# Patient Record
Sex: FEMALE | Race: ASIAN | URBAN - METROPOLITAN AREA
[De-identification: names, ages, dates, MRNs, and addresses within clinical notes are randomized per-mention and may not be internally consistent; named-entity substitution may affect disease eponyms.]

---

## 2017-01-11 DIAGNOSIS — J40 BRONCHITIS: ICD-10-CM

## 2017-05-11 ENCOUNTER — HOSPITAL ENCOUNTER (OUTPATIENT)
Dept: LAB | Age: 62
Discharge: HOME | End: 2017-05-11
Payer: OTHER GOVERNMENT

## 2017-05-11 ENCOUNTER — OFFICE VISIT (OUTPATIENT)
Dept: INTERNAL MEDICINE CLINIC | Age: 62
End: 2017-05-11

## 2017-05-11 VITALS
BODY MASS INDEX: 24.38 KG/M2 | WEIGHT: 113 LBS | HEART RATE: 83 BPM | TEMPERATURE: 97.9 F | DIASTOLIC BLOOD PRESSURE: 72 MMHG | OXYGEN SATURATION: 99 % | SYSTOLIC BLOOD PRESSURE: 123 MMHG | RESPIRATION RATE: 18 BRPM | HEIGHT: 57 IN

## 2017-05-11 DIAGNOSIS — E78.00 HYPERCHOLESTEREMIA: ICD-10-CM

## 2017-05-11 DIAGNOSIS — H81.11 BPPV (BENIGN PAROXYSMAL POSITIONAL VERTIGO), RIGHT: ICD-10-CM

## 2017-05-11 DIAGNOSIS — Z12.11 SCREEN FOR COLON CANCER: ICD-10-CM
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
## 2017-05-11 DIAGNOSIS — R60.9 DEPENDENT EDEMA: ICD-10-CM

## 2017-05-11 DIAGNOSIS — Z12.31 VISIT FOR SCREENING MAMMOGRAM: ICD-10-CM

## 2017-05-11 DIAGNOSIS — I10 ESSENTIAL HYPERTENSION: ICD-10-CM

## 2017-05-11 DIAGNOSIS — I10 ESSENTIAL HYPERTENSION: Primary | ICD-10-CM

## 2017-05-11 LAB
ALBUMIN SERPL BCP-MCNC: 4.3 G/DL (ref 3.4–5)
ALBUMIN/GLOB SERPL: 1.3 {RATIO} (ref 0.8–1.7)
ALP SERPL-CCNC: 125 U/L (ref 45–117)
ALT SERPL-CCNC: 27 U/L (ref 13–56)
ANION GAP BLD CALC-SCNC: 11 MMOL/L (ref 3–18)
AST SERPL W P-5'-P-CCNC: 24 U/L (ref 15–37)
BILIRUB SERPL-MCNC: 0.3 MG/DL (ref 0.2–1)
BUN SERPL-MCNC: 16 MG/DL (ref 7–18)
BUN/CREAT SERPL: 18 (ref 12–20)
CALCIUM SERPL-MCNC: 8.8 MG/DL (ref 8.5–10.1)
CHLORIDE SERPL-SCNC: 102 MMOL/L (ref 100–108)
CHOLEST SERPL-MCNC: 175 MG/DL
CO2 SERPL-SCNC: 25 MMOL/L (ref 21–32)
CREAT SERPL-MCNC: 0.9 MG/DL (ref 0.6–1.3)
GLOBULIN SER CALC-MCNC: 3.4 G/DL (ref 2–4)
GLUCOSE SERPL-MCNC: 107 MG/DL (ref 74–99)
HDLC SERPL-MCNC: 87 MG/DL (ref 40–60)
HDLC SERPL: 2 {RATIO} (ref 0–5)
LDLC SERPL CALC-MCNC: 75.4 MG/DL (ref 0–100)
LIPID PROFILE,FLP: ABNORMAL
POTASSIUM SERPL-SCNC: 3.4 MMOL/L (ref 3.5–5.5)
PROT SERPL-MCNC: 7.7 G/DL (ref 6.4–8.2)
SODIUM SERPL-SCNC: 138 MMOL/L (ref 136–145)
TRIGL SERPL-MCNC: 63 MG/DL (ref ?–150)
VLDLC SERPL CALC-MCNC: 12.6 MG/DL

## 2017-05-11 PROCEDURE — 80061 LIPID PANEL: CPT | Performed by: FAMILY MEDICINE

## 2017-05-11 PROCEDURE — 80053 COMPREHEN METABOLIC PANEL: CPT | Performed by: FAMILY MEDICINE

## 2017-05-11 PROCEDURE — 36415 COLL VENOUS BLD VENIPUNCTURE: CPT | Performed by: FAMILY MEDICINE

## 2017-05-11 RX ORDER — LOSARTAN POTASSIUM 50 MG/1
50 TABLET ORAL DAILY
Qty: 90 TAB | Refills: 1 | Status: SHIPPED | OUTPATIENT
Start: 2017-05-11 | End: 2017-07-18 | Stop reason: SDUPTHER

## 2017-05-11 RX ORDER — SIMVASTATIN 20 MG/1
TABLET, FILM COATED ORAL
Qty: 90 TAB | Refills: 1 | Status: SHIPPED | OUTPATIENT
Start: 2017-05-11 | End: 2017-07-18 | Stop reason: SDUPTHER

## 2017-05-11 RX ORDER — MECLIZINE HYDROCHLORIDE 25 MG/1
25 TABLET ORAL
Qty: 270 TAB | Refills: 1 | Status: SHIPPED | OUTPATIENT
Start: 2017-05-11 | End: 2017-07-18 | Stop reason: SDUPTHER

## 2017-05-11 NOTE — PROGRESS NOTES
Theo Latham presents today at the clinic for      Chief Complaint   Patient presents with    Cholesterol Problem    Labs    Medication Refill    Hypertension        Wt Readings from Last 3 Encounters:   05/11/17 113 lb (51.3 kg)   12/29/16 109 lb (49.4 kg)   10/13/16 107 lb (48.5 kg)     Temp Readings from Last 3 Encounters:   05/11/17 97.9 °F (36.6 °C) (Oral)   12/29/16 98.6 °F (37 °C) (Oral)   10/13/16 98 °F (36.7 °C) (Oral)     BP Readings from Last 3 Encounters:   05/11/17 123/72   12/29/16 131/76   10/13/16 134/77     Pulse Readings from Last 3 Encounters:   05/11/17 83   12/29/16 69   10/13/16 80       There are no preventive care reminders to display for this patient. Coordination of Care:    1. Have you been to the ER, urgent care clinic since your last visit? Hospitalized since your last visit? No    2. Have you seen or consulted any other health care providers outside of the [address] [place] [address] since your last visit? Include any pap smears or colon screening.  No

## 2017-05-11 NOTE — MR AVS SNAPSHOT
Visit Information Date & Time Provider Department Dept. Phone Encounter #  
 5/11/2017  9:30 AM Renea Jansen MD [facility] [phone] [id number] Follow-up Instructions Return in about 6 months (around 11/11/2017) for HTN, hyperlipidemia. Upcoming Health Maintenance Date Due COLONOSCOPY 9/22/2017* BREAST CANCER SCRN MAMMOGRAM 9/29/2017* INFLUENZA AGE 9 TO ADULT 8/1/2017 PAP AKA CERVICAL CYTOLOGY 5/11/2020 DTaP/Tdap/Td series (2 - Td) 4/28/2026 *Topic was postponed. The date shown is not the original due date. Allergies as of 5/11/2017  Review Complete On: 5/11/2017 By: Renea Jansen MD  
 No Known Allergies Current Immunizations  Reviewed on 10/13/2016 Name Date Influenza Vaccine (Quad) PF 10/13/2016, 10/23/2015 Tdap 4/28/2016 Not reviewed this visit You Were Diagnosed With   
  
 Codes Comments Essential hypertension    -  Primary ICD-10-CM: I10 
ICD-9-CM: 401.9 Hypercholesteremia     ICD-10-CM: E78.00 ICD-9-CM: 272.0 Visit for screening mammogram     ICD-10-CM: Z12.31 
ICD-9-CM: V76.12 Screen for colon cancer     ICD-10-CM: Z12.11 ICD-9-CM: V76.51 Dependent edema     ICD-10-CM: R60.9 ICD-9-CM: 904. 3 Vitals BP Pulse Temp Resp Height(growth percentile) Weight(growth percentile) 123/72 (BP 1 Location: Right arm, BP Patient Position: Sitting) 83 97.9 °F (36.6 °C) (Oral) 18 4' 9\" (1.448 m) 113 lb (51.3 kg) SpO2 BMI OB Status Smoking Status 99% 24.45 kg/m2 Postmenopausal Former Smoker Vitals History BMI and BSA Data Body Mass Index Body Surface Area  
 24.45 kg/m 2 1.44 m 2 Preferred Pharmacy Pharmacy Name Phone [facility] [phone] Your Updated Medication List  
  
   
This list is accurate as of: 5/11/17 10:12 AM.  Always use your most recent med list.  
  
  
  
  
 albuterol 90 mcg/actuation inhaler Commonly known as:  PROVENTIL HFA, VENTOLIN HFA, PROAIR HFA Take 2 Puffs by inhalation every six (6) hours as needed for Wheezing. amLODIPine 10 mg tablet Commonly known as:  [place]ack[place] Cradle TAKE 1 TABLET BY MOUTH ONCE DAILY  Indications: HYPERTENSION  
  
 cetirizine 5 mg tablet Commonly known as:  ZYRTEC Take 1 Tab by mouth daily as needed for Allergies. Indications: ALLERGIC RHINITIS  
  
 inhalational spacing device 1 Each by Does Not Apply route as needed. losartan 50 mg tablet Commonly known as:  COZAAR Take 1 Tab by mouth daily. simvastatin 20 mg tablet Commonly known as:  ZOCOR  
TAKE 1 TABLET BY MOUTH NIGHTLY AT BEDTIME  Indications: hypercholesterolemia Prescriptions Sent to Pharmacy Refills  
 simvastatin (ZOCOR) 20 mg tablet 1 Sig: TAKE 1 TABLET BY MOUTH NIGHTLY AT BEDTIME  Indications: hypercholesterolemia Class: Normal  
 Pharmacy: [facility] [address], [address] [facility] Ph #: [phone]  
 losartan (COZAAR) 50 mg tablet 1 Sig: Take 1 Tab by mouth daily. Class: Normal  
 Pharmacy: [facility]. [address], [address] [facility] Ph #: [phone] Route: Oral  
  
We Performed the Following REFERRAL FOR COLONOSCOPY [LRZ427 Custom] Comments:  
 Please evaluate patient for screening for colon CA. Send to [facility]. Follow-up Instructions Return in about 6 months (around 11/11/2017) for HTN, hyperlipidemia. To-Do List   
 05/11/2017 Lab:  LIPID PANEL   
  
 05/11/2017 Imaging:  [facility]MO BI SCREENING INCL CAD   
  
 05/11/2017 Lab:  METABOLIC PANEL, COMPREHENSIVE Referral Information Referral ID Referred By Referred To  
  
 [id number] Jackie Benton Not Available Visits Status Start Date End Date 1 New Request 5/11/17 5/11/18 If your referral has a status of pending review or denied, additional information will be sent to support the outcome of this decision. Patient Instructions Bedbugs: Care Instructions Your Care Instructions Bedbugs are tiny bugs that feed on blood from animals or people. They have that name because they like to hide in bedding and mattresses. Bedbugs rarely spread disease to people. But itching from the bites can be so bad that you may scratch hard enough to break the skin. That can lead to infection. The bites can also cause an allergic reaction in some people. The bugs can spread into cracks and crevices in the room and lay their eggs in anything that is in the room, including clothing and furniture. This makes them very hard to kill. Getting rid of bedbugs The best way to get rid of bedbugs is to call a professional insect control company. They use special pesticides and other equipment to kill the bugs and their eggs. If you decide to buy your own pesticide, check the label. Make sure it says that it is for bedbugs. Be sure to follow the directions carefully. You may have to use the pesticide more than once. Vacuum or launder everything in the room that might hide the bugs. Washing and then drying items in a dryer on a hot setting works to kill bedbugs in clothing and linens. Turn the dryer to the hottest setting that the fabric can handle. After your mattress has been cleared of bedbugs, you can buy a special mattress cover that is made to keep bedbugs out of your mattress. Follow-up care is a key part of your treatment and safety. Be sure to make and go to all appointments, and call your doctor if you are having problems. It's also a good idea to know your test results and keep a list of the medicines you take. How can you care for yourself at home? · Wash the bites with soap to lower the chance of infection. Try not to scratch. · Use calamine lotion or an anti-itch cream to stop the itching. You can also hold an oatmeal-soaked washcloth on the itchy area for 15 minutes. You can buy an oatmeal powder, such as Aveeno Colloidal Oatmeal, in drugstores. · Use an ice pack to stop the swelling. When should you call for help? Call your doctor now or seek immediate medical care if: 
· You have signs of infection, such as: 
¨ Increased pain, swelling, warmth, or redness. ¨ Red streaks leading from a bite area. ¨ Pus draining from a bite area. ¨ A fever. Watch closely for changes in your health, and be sure to contact your doctor if: · Anyone else in your family has itching. · You do not get better as expected. Where can you learn more? Go to http://kalpesh-melina.info/. Enter G246 in the search box to learn more about \"Bedbugs: Care Instructions. \" Current as of: November 1, 2016 Content Version: 11.2 © 9712-4325 [employer]. Care instructions adapted under license by [employer] (which disclaims liability or warranty for this information). If you have questions about a medical condition or this instruction, always ask your healthcare professional. [address] any warranty or liability for your use of this information. Introducing [facility]! Dear Kavya Owen: Thank you for requesting a [employer] account. Our records indicate that you already have an active [employer] account. You can access your account anytime at [email or web address] Did you know that you can access your hospital and ER discharge instructions at any time in [employer]? You can also review all of your test results from your hospital stay or ER visit. Additional Information If you have questions, please visit the Frequently Asked Questions section of the [employer] website at [email or web address] Remember, MyChart is NOT to be used for urgent needs. For medical emergencies, dial 911. Now available from your iPhone and Android! Please provide this summary of care documentation to your next provider. Your primary care clinician is listed as Al Ann. If you have any questions after today's visit, please call [phone].

## 2017-05-11 NOTE — PATIENT INSTRUCTIONS
Bedbugs: Care Instructions  Your Care Instructions    Bedbugs are tiny bugs that feed on blood from animals or people. They have that name because they like to hide in bedding and mattresses. Bedbugs rarely spread disease to people. But itching from the bites can be so bad that you may scratch hard enough to break the skin. That can lead to infection. The bites can also cause an allergic reaction in some people. The bugs can spread into cracks and crevices in the room and lay their eggs in anything that is in the room, including clothing and furniture. This makes them very hard to kill. Getting rid of bedbugs  The best way to get rid of bedbugs is to call a professional insect control company. They use special pesticides and other equipment to kill the bugs and their eggs. If you decide to buy your own pesticide, check the label. Make sure it says that it is for bedbugs. Be sure to follow the directions carefully. You may have to use the pesticide more than once. Vacuum or launder everything in the room that might hide the bugs. Washing and then drying items in a dryer on a hot setting works to kill bedbugs in clothing and linens. Turn the dryer to the hottest setting that the fabric can handle. After your mattress has been cleared of bedbugs, you can buy a special mattress cover that is made to keep bedbugs out of your mattress. Follow-up care is a key part of your treatment and safety. Be sure to make and go to all appointments, and call your doctor if you are having problems. It's also a good idea to know your test results and keep a list of the medicines you take. How can you care for yourself at home? · Wash the bites with soap to lower the chance of infection. Try not to scratch. · Use calamine lotion or an anti-itch cream to stop the itching. You can also hold an oatmeal-soaked washcloth on the itchy area for 15 minutes.  You can buy an oatmeal powder, such as Aveeno Colloidal Oatmeal, in drugstores. · Use an ice pack to stop the swelling. When should you call for help? Call your doctor now or seek immediate medical care if:  · You have signs of infection, such as:  ¨ Increased pain, swelling, warmth, or redness. ¨ Red streaks leading from a bite area. ¨ Pus draining from a bite area. ¨ A fever. Watch closely for changes in your health, and be sure to contact your doctor if:  · Anyone else in your family has itching. · You do not get better as expected. Where can you learn more? Go to http://kalpesh-melina.info/. Enter G246 in the search box to learn more about \"Bedbugs: Care Instructions. \"  Current as of: November 1, 2016  Content Version: 11.2  © 7274-1734 [employer]. Care instructions adapted under license by [employer] (which disclaims liability or warranty for this information). If you have questions about a medical condition or this instruction, always ask your healthcare professional. [address] any warranty or liability for your use of this information.

## 2017-05-11 NOTE — PROGRESS NOTES
Subjective:   Patient is a 58y.o. year old female who presents for Cholesterol Problem; Labs; Medication Refill; and Hypertension  1. HTN:  On amlodipine. No CP, SOB. Gets some swelling in the legs, worse in the left ankle, leg.    2.  LE edema:  She has compression stockings. No orthopnea. This is chronic. Has in both legs but worse in left. Has been on amlodipine which could cause this. 3.  Hyperlipidemia:  Stable on Zocor. No complaints    4. Allergic rhinitis:  Stable on OTC Zyrtec. 5.  BPPV:  Dizziness much better now. But uses meclizine prn still when gets dizziness. 6.  Bug bites: On arms. Saw small bugs crawling on her when at other relative's house and ID'd as bed bugs. Rash slowly resolving now. No current itching. Bumps have gone away    Review of Systems   Constitutional: Negative. Cardiovascular: Negative. Skin: Positive for rash. Current Outpatient Prescriptions on File Prior to Visit   Medication Sig Dispense Refill    albuterol (PROVENTIL HFA, VENTOLIN HFA, PROAIR HFA) 90 mcg/actuation inhaler Take 2 Puffs by inhalation every six (6) hours as needed for Wheezing. 1 Inhaler 3    inhalational spacing device 1 Each by Does Not Apply route as needed. 1 Device 0    cetirizine (ZYRTEC) 5 mg tablet Take 1 Tab by mouth daily as needed for Allergies. Indications: ALLERGIC RHINITIS 30 Tab 1    amLODIPine (NORVASC) 10 mg tablet TAKE 1 TABLET BY MOUTH ONCE DAILY  Indications: HYPERTENSION 90 Tab 1     No current facility-administered medications on file prior to visit. Reviewed PmHx, RxHx, FmHx, SocHx, AllgHx and updated and dated in the chart. Nurse notes were reviewed and are correct    Objective:     Vitals:    05/11/17 0912   BP: 123/72   Pulse: 83   Resp: 18   Temp: 97.9 °F (36.6 °C)   TempSrc: Oral   SpO2: 99%   Weight: 113 lb (51.3 kg)   Height: 4' 9\" (1.448 m)     Physical Exam   Constitutional: She is oriented to person, place, and time.  She appears well-developed and well-nourished. No distress. HENT:   Head: Normocephalic and atraumatic. Right Ear: External ear normal.   Left Ear: External ear normal.   Nose: Nose normal.   Mouth/Throat: Oropharynx is clear and moist.   Eyes: EOM are normal. Pupils are equal, round, and reactive to light. Neck: Normal range of motion. Neck supple. Carotid bruit is not present. No tracheal deviation present. Cardiovascular: Normal rate, regular rhythm, normal heart sounds and intact distal pulses. Exam reveals no gallop and no friction rub. No murmur heard. Pulmonary/Chest: Effort normal and breath sounds normal. She has no wheezes. She has no rales. Abdominal: Soft. Bowel sounds are normal. She exhibits no distension. There is no tenderness. Musculoskeletal: She exhibits edema (1+ on left ankle). She exhibits no tenderness. Legs:  14 inches bilaterally (no difference). No calf tenderness, neg Rosina's sign. Lymphadenopathy:     She has no cervical adenopathy. Neurological: She is alert and oriented to person, place, and time. Skin: Skin is warm and dry. Faint rash on upper arms, but currently not raised, and not erythematous. Psychiatric: She has a normal mood and affect. Her behavior is normal.   Nursing note and vitals reviewed. Assessment/ Plan:     Mari Roman was seen today for cholesterol problem, labs, medication refill and hypertension. Diagnoses and all orders for this visit:    Essential hypertension:  Because of leg swelling going to switch from Norvasc to Cozaar. Not doing ACE because she already has some cough issues and don't want to confuse things. -     METABOLIC PANEL, COMPREHENSIVE; Future  -     losartan (COZAAR) 50 mg tablet; Take 1 Tab by mouth daily. Hypercholesteremia  -     METABOLIC PANEL, COMPREHENSIVE; Future  -     LIPID PANEL;  Future  -     simvastatin (ZOCOR) 20 mg tablet; TAKE 1 TABLET BY MOUTH NIGHTLY AT BEDTIME  Indications: hypercholesterolemia    Visit for screening mammogram  -     [facility] MAMMO BI SCREENING INCL CAD; Future    Screen for colon cancer  -     REFERRAL FOR COLONOSCOPY    Dependent edema:  Stopping the amlodipine to see if that's contributing. Changing to ARB. Recommended elevation, compression stockings. If worsening, will send for venous U/S, but she's not interested at this point in getting that done. BPPV (benign paroxysmal positional vertigo), right:  Much improved, but still using some occasional meclizine. -     meclizine (ANTIVERT) 25 mg tablet; Take 1 Tab by mouth three (3) times daily as needed. Indications: VERTIGO    Bed bug bites, initial encounter:  Presumptive, based on history. The bites/rash is fading so I think it will resolve. Gave handout. Recommended washing clothes that brought back from the other home in hot water to be safe. I have discussed the diagnosis with the patient and the intended plan as seen in the above orders. The patient verbalized understanding and agrees with the plan. Follow-up Disposition:  Return in about 6 months (around 11/11/2017) for HTN, hyperlipidemia.     Flaca Velasquez MD

## 2017-05-12 NOTE — PROGRESS NOTES
Your blood work looks good. A few things: Your blood sugar is a little elevated in the prediabetes range. This is not diabetes. Watching your sugar and carbohydrates in your diet will help with this. Your potassium was slightly low. Eating some bananas can help. Cholesterol looked great!

## 2017-05-17 DIAGNOSIS — Z12.31 VISIT FOR SCREENING MAMMOGRAM: ICD-10-CM

## 2017-07-18 DIAGNOSIS — H81.11 BPPV (BENIGN PAROXYSMAL POSITIONAL VERTIGO), RIGHT: ICD-10-CM

## 2017-07-18 DIAGNOSIS — E78.00 HYPERCHOLESTEREMIA: ICD-10-CM

## 2017-07-18 DIAGNOSIS — I10 ESSENTIAL HYPERTENSION: ICD-10-CM

## 2017-07-18 DIAGNOSIS — J40 BRONCHITIS: ICD-10-CM

## 2017-07-18 RX ORDER — ALBUTEROL SULFATE 90 UG/1
2 AEROSOL, METERED RESPIRATORY (INHALATION)
Qty: 1 INHALER | Refills: 3 | Status: SHIPPED | OUTPATIENT
Start: 2017-07-18 | End: 2018-03-22 | Stop reason: SDUPTHER

## 2017-07-18 RX ORDER — AMLODIPINE BESYLATE 10 MG/1
TABLET ORAL
Qty: 90 TAB | Refills: 1 | Status: SHIPPED | OUTPATIENT
Start: 2017-07-18 | End: 2018-05-22 | Stop reason: ALTCHOICE

## 2017-07-18 RX ORDER — MECLIZINE HYDROCHLORIDE 25 MG/1
25 TABLET ORAL
Qty: 270 TAB | Refills: 1 | Status: SHIPPED | OUTPATIENT
Start: 2017-07-18 | End: 2018-03-22 | Stop reason: SDUPTHER

## 2017-07-18 RX ORDER — SIMVASTATIN 20 MG/1
TABLET, FILM COATED ORAL
Qty: 90 TAB | Refills: 1 | Status: SHIPPED | OUTPATIENT
Start: 2017-07-18 | End: 2018-03-22 | Stop reason: SDUPTHER

## 2017-07-18 RX ORDER — LOSARTAN POTASSIUM 50 MG/1
50 TABLET ORAL DAILY
Qty: 90 TAB | Refills: 1 | Status: SHIPPED | OUTPATIENT
Start: 2017-07-18 | End: 2018-03-22 | Stop reason: SDUPTHER

## 2017-07-18 RX ORDER — CETIRIZINE HYDROCHLORIDE 5 MG/1
5 TABLET ORAL
Qty: 90 TAB | Refills: 1 | Status: SHIPPED | OUTPATIENT
Start: 2017-07-18 | End: 2018-03-22 | Stop reason: SDUPTHER

## 2018-03-22 ENCOUNTER — OFFICE VISIT (OUTPATIENT)
Dept: INTERNAL MEDICINE CLINIC | Age: 63
End: 2018-03-22

## 2018-03-22 VITALS
HEART RATE: 94 BPM | OXYGEN SATURATION: 99 % | HEIGHT: 57 IN | TEMPERATURE: 98 F | RESPIRATION RATE: 18 BRPM | SYSTOLIC BLOOD PRESSURE: 131 MMHG | DIASTOLIC BLOOD PRESSURE: 73 MMHG | BODY MASS INDEX: 25.24 KG/M2 | WEIGHT: 117 LBS

## 2018-03-22 DIAGNOSIS — E78.00 HYPERCHOLESTEREMIA: ICD-10-CM

## 2018-03-22 DIAGNOSIS — K21.9 GASTROESOPHAGEAL REFLUX DISEASE, ESOPHAGITIS PRESENCE NOT SPECIFIED: ICD-10-CM

## 2018-03-22 DIAGNOSIS — H81.11 BPPV (BENIGN PAROXYSMAL POSITIONAL VERTIGO), RIGHT: ICD-10-CM

## 2018-03-22 DIAGNOSIS — I10 ESSENTIAL HYPERTENSION: ICD-10-CM

## 2018-03-22 DIAGNOSIS — J30.9 CHRONIC ALLERGIC RHINITIS, UNSPECIFIED SEASONALITY, UNSPECIFIED TRIGGER: ICD-10-CM

## 2018-03-22 DIAGNOSIS — J40 BRONCHITIS: Primary | ICD-10-CM

## 2018-03-22 DIAGNOSIS — M19.90 ARTHRITIS: ICD-10-CM

## 2018-03-22 RX ORDER — IBUPROFEN 800 MG/1
800 TABLET ORAL
Qty: 90 TAB | Refills: 1 | Status: SHIPPED | OUTPATIENT
Start: 2018-03-22 | End: 2018-05-22 | Stop reason: SDUPTHER

## 2018-03-22 RX ORDER — ALBUTEROL SULFATE 90 UG/1
2 AEROSOL, METERED RESPIRATORY (INHALATION)
Qty: 1 INHALER | Refills: 3 | Status: SHIPPED | OUTPATIENT
Start: 2018-03-22 | End: 2018-05-22 | Stop reason: SDUPTHER

## 2018-03-22 RX ORDER — AZITHROMYCIN 250 MG/1
TABLET, FILM COATED ORAL
Qty: 6 TAB | Refills: 0 | Status: SHIPPED | OUTPATIENT
Start: 2018-03-22 | End: 2018-05-22 | Stop reason: ALTCHOICE

## 2018-03-22 RX ORDER — MECLIZINE HYDROCHLORIDE 25 MG/1
25 TABLET ORAL
Qty: 270 TAB | Refills: 1 | Status: SHIPPED | OUTPATIENT
Start: 2018-03-22 | End: 2018-05-22 | Stop reason: SDUPTHER

## 2018-03-22 RX ORDER — CETIRIZINE HYDROCHLORIDE 5 MG/1
5 TABLET ORAL
Qty: 90 TAB | Refills: 1 | Status: SHIPPED | OUTPATIENT
Start: 2018-03-22 | End: 2018-05-22 | Stop reason: SDUPTHER

## 2018-03-22 RX ORDER — SIMVASTATIN 20 MG/1
TABLET, FILM COATED ORAL
Qty: 90 TAB | Refills: 1 | Status: SHIPPED | OUTPATIENT
Start: 2018-03-22 | End: 2018-05-22 | Stop reason: SDUPTHER

## 2018-03-22 RX ORDER — LOSARTAN POTASSIUM 50 MG/1
50 TABLET ORAL DAILY
Qty: 90 TAB | Refills: 1 | Status: SHIPPED | OUTPATIENT
Start: 2018-03-22 | End: 2018-05-22 | Stop reason: SDUPTHER

## 2018-03-22 RX ORDER — PHENOL/SODIUM PHENOLATE
20 AEROSOL, SPRAY (ML) MUCOUS MEMBRANE DAILY
Qty: 90 TAB | Refills: 1 | Status: SHIPPED | OUTPATIENT
Start: 2018-03-22 | End: 2018-05-22 | Stop reason: SDUPTHER

## 2018-03-22 RX ORDER — PREDNISONE 20 MG/1
40 TABLET ORAL
Qty: 10 TAB | Refills: 0 | Status: SHIPPED | OUTPATIENT
Start: 2018-03-22 | End: 2018-03-27

## 2018-03-22 NOTE — MR AVS SNAPSHOT
[address] [place] [address] [address] [address] 
[phone] Patient: Veronica Cade MRN: [id number] [facility]:1/81/9826 Visit Information Date & Time Provider Department Dept. Phone Encounter #  
 3/22/2018  3:15 PM Champ Aguayo MD [facility] [id number] Follow-up Instructions Return in about 6 months (around 9/22/2018) for HTN, arthritis, GERD. Upcoming Health Maintenance Date Due COLONOSCOPY 1/16/1973 BREAST CANCER SCRN MAMMOGRAM 1/16/2005 Influenza Age 5 to Adult 8/1/2017 PAP AKA CERVICAL CYTOLOGY 5/11/2020 DTaP/Tdap/Td series (2 - Td) 4/28/2026 Allergies as of 3/22/2018  Review Complete On: 3/22/2018 By: Chmap Aguayo MD  
 No Known Allergies Current Immunizations  Reviewed on 10/13/2016 Name Date Influenza Vaccine (Quad) PF 10/13/2016, 10/23/2015 Tdap 4/28/2016 Not reviewed this visit You Were Diagnosed With   
  
 Codes Comments Bronchitis    -  Primary ICD-10-CM: Q11 ICD-9-CM: 195 Hypercholesteremia     ICD-10-CM: E78.00 ICD-9-CM: 272.0 Essential hypertension     ICD-10-CM: I10 
ICD-9-CM: 401.9 BPPV (benign paroxysmal positional vertigo), right     ICD-10-CM: H81.11 
ICD-9-CM: 386.11 Gastroesophageal reflux disease, esophagitis presence not specified     ICD-10-CM: K21.9 ICD-9-CM: 530.81 Arthritis     ICD-10-CM: M19.90 ICD-9-CM: 716.90 Chronic allergic rhinitis, unspecified seasonality, unspecified trigger     ICD-10-CM: J30.9 ICD-9-CM: 477.9 Vitals BP Pulse Temp Resp Height(growth percentile) Weight(growth percentile) 131/73 94 98 °F (36.7 °C) (Oral) 18 4' 9\" (1.448 m) 117 lb (53.1 kg) SpO2 BMI OB Status Smoking Status 99% 25.32 kg/m2 Postmenopausal Former Smoker Vitals History BMI and BSA Data  Body Mass Index Body Surface Area  
 25.32 kg/m 2 1.46 m 2  
  
  
 Preferred Pharmacy Pharmacy Name Phone [facility], [address] [phone] Your Updated Medication List  
  
   
This list is accurate as of 3/22/18  4:08 PM.  Always use your most recent med list.  
  
  
  
  
 albuterol 90 mcg/actuation inhaler Commonly known as:  PROVENTIL HFA, VENTOLIN HFA, PROAIR HFA Take 2 Puffs by inhalation every six (6) hours as needed for Wheezing. amLODIPine 10 mg tablet Commonly known as:  Larayne Sherlyn TAKE 1 TABLET BY MOUTH ONCE DAILY  Indications: hypertension  
  
 azithromycin 250 mg tablet Commonly known as:  Evlyn Jerson Take two tablets today then one tablet daily  
  
 cetirizine 5 mg tablet Commonly known as:  ZYRTEC Take 1 Tab by mouth daily as needed for Allergies. Indications: Allergic Rhinitis  
  
 ibuprofen 800 mg tablet Commonly known as:  MOTRIN Take 1 Tab by mouth every eight (8) hours as needed for Pain.  
  
 inhalational spacing device 1 Each by Does Not Apply route as needed. losartan 50 mg tablet Commonly known as:  COZAAR Take 1 Tab by mouth daily. meclizine 25 mg tablet Commonly known as:  ANTIVERT Take 1 Tab by mouth three (3) times daily as needed. Indications: Vertigo Omeprazole delayed release 20 mg tablet Commonly known as:  PRILOSEC D/R Take 1 Tab by mouth daily. predniSONE 20 mg tablet Commonly known as:  Jackquekoreyn Mellow Take 2 Tabs by mouth daily (with breakfast) for 5 days. simvastatin 20 mg tablet Commonly known as:  ZOCOR  
TAKE 1 TABLET BY MOUTH NIGHTLY AT BEDTIME  Indications: hypercholesterolemia Prescriptions Sent to Pharmacy Refills  
 simvastatin (ZOCOR) 20 mg tablet 1 Sig: TAKE 1 TABLET BY MOUTH NIGHTLY AT BEDTIME  Indications: hypercholesterolemia Class: Normal  
 Pharmacy: [facility] [address] [facility], [address] [facility] Ph #: [phone] losartan (COZAAR) 50 mg tablet 1 Sig: Take 1 Tab by mouth daily. Class: Normal  
 Pharmacy: [address], [address] Ph #: [phone] Route: Oral  
 meclizine (ANTIVERT) 25 mg tablet 1 Sig: Take 1 Tab by mouth three (3) times daily as needed. Indications: Vertigo Class: Normal  
 Pharmacy: [address], [address] Ph #: [phone] Route: Oral  
 albuterol (PROVENTIL HFA, VENTOLIN HFA, PROAIR HFA) 90 mcg/actuation inhaler 3 Sig: Take 2 Puffs by inhalation every six (6) hours as needed for Wheezing. Class: Normal  
 Pharmacy: [address], [address] [place] [address] Ph #: [phone] Route: Inhalation  
 cetirizine (ZYRTEC) 5 mg tablet 1 Sig: Take 1 Tab by mouth daily as needed for Allergies. Indications: Allergic Rhinitis Class: Normal  
 Pharmacy: [address], [address] [place] [address] Ph #: [phone] Route: Oral  
 Omeprazole delayed release (PRILOSEC D/R) 20 mg tablet 1 Sig: Take 1 Tab by mouth daily. Class: Normal  
 Pharmacy: [address], [address] [place] [address] Ph #: [phone] Route: Oral  
 ibuprofen (MOTRIN) 800 mg tablet 1 Sig: Take 1 Tab by mouth every eight (8) hours as needed for Pain. Class: Normal  
 Pharmacy: [address], [address] Ph #: [phone] Route: Oral  
 predniSONE (DELTASONE) 20 mg tablet 0 Sig: Take 2 Tabs by mouth daily (with breakfast) for 5 days. Class: Normal  
 Pharmacy: [address], [address] [place] [address] Ph #: [phone] Route: Oral  
 azithromycin (ZITHROMAX Z-BILL) 250 mg tablet 0 Sig: Take two tablets today then one tablet daily  Class: Normal  
 Pharmacy: [address], [address] [place] [address] Drive [address] [place] [address] #: [phone] Follow-up Instructions Return in about 6 months (around 9/22/2018) for HTN, arthritis, GERD. Patient Instructions Bronchitis: Care Instructions Your Care Instructions Bronchitis is inflammation of the bronchial tubes, which carry air to the lungs. The tubes swell and produce mucus, or phlegm. The mucus and inflamed bronchial tubes make you cough. You may have trouble breathing. Most cases of bronchitis are caused by viruses like those that cause colds. Antibiotics usually do not help and they may be harmful. Bronchitis usually develops rapidly and lasts about 2 to 3 weeks in otherwise healthy people. Follow-up care is a key part of your treatment and safety. Be sure to make and go to all appointments, and call your doctor if you are having problems. It's also a good idea to know your test results and keep a list of the medicines you take. How can you care for yourself at home? · Take all medicines exactly as prescribed. Call your doctor if you think you are having a problem with your medicine. · Get some extra rest. 
· Take an over-the-counter pain medicine, such as acetaminophen (Tylenol), ibuprofen (Advil, Motrin), or naproxen (Aleve) to reduce fever and relieve body aches. Read and follow all instructions on the label. · Do not take two or more pain medicines at the same time unless the doctor told you to. Many pain medicines have acetaminophen, which is Tylenol. Too much acetaminophen (Tylenol) can be harmful. · Take an over-the-counter cough medicine that contains dextromethorphan to help quiet a dry, hacking cough so that you can sleep. Avoid cough medicines that have more than one active ingredient. Read and follow all instructions on the label. · Breathe moist air from a humidifier, hot shower, or sink filled with hot water. The heat and moisture will thin mucus so you can cough it out. · Do not smoke. Smoking can make bronchitis worse. If you need help quitting, talk to your doctor about stop-smoking programs and medicines. These can increase your chances of quitting for good. When should you call for help? Call 911 anytime you think you may need emergency care. For example, call if: 
? · You have severe trouble breathing. ?Call your doctor now or seek immediate medical care if: 
? · You have new or worse trouble breathing. ? · You cough up dark brown or bloody mucus (sputum). ? · You have a new or higher fever. ? · You have a new rash. ? Watch closely for changes in your health, and be sure to contact your doctor if: 
? · You cough more deeply or more often, especially if you notice more mucus or a change in the color of your mucus. ? · You are not getting better as expected. Where can you learn more? Go to http://kalpesh-melina.info/. Enter H333 in the search box to learn more about \"Bronchitis: Care Instructions. \" Current as of: May 12, 2017 Content Version: 11.4 © 4068-1891 [employer]. Care instructions adapted under license by [employer] (which disclaims liability or warranty for this information). If you have questions about a medical condition or this instruction, always ask your healthcare professional. [address] any warranty or liability for your use of this information. Introducing [facility]! Dear Lisbeth Brar: Thank you for requesting a Scripped account. Our records indicate that you already have an active Scripped account. You can access your account anytime at [email or web address] Did you know that you can access your hospital and ER discharge instructions at any time in Scripped? You can also review all of your test results from your hospital stay or ER visit. Additional Information If you have questions, please visit the Frequently Asked Questions section of the [employer] website at [email or web address] Remember, [employer] is NOT to be used for urgent needs. For medical emergencies, dial 911. Now available from your iPhone and Android! Please provide this summary of care documentation to your next provider. Your primary care clinician is listed as Tameka Benedict. If you have any questions after today's visit, please call [phone].

## 2018-03-22 NOTE — PROGRESS NOTES
Subjective:   Patient is a 61y.o. year old female who presents for Cough and Wheezing  1. Cough:  Having cough and wheezing. She got a URI in [facility] when was there. Has been going on for 2 weeks. Started with URI symptoms. Upper symptoms better. No sinus pressure. But cough has not gotten better. Still smoking a little. Doesn't smoke a lot, about 1 pack per week. Having wheezing and out of albuterol. Feeling SOB. Coughing up mucus. Had bronchitis several times ago. Also needs refills of all her meds. She is out of all of them locally. They were sent to [facility]. Review of Systems   Constitutional: Negative. Negative for fever. Respiratory: Positive for cough, sputum production and shortness of breath. Current Outpatient Prescriptions on File Prior to Visit   Medication Sig Dispense Refill    amLODIPine (NORVASC) 10 mg tablet TAKE 1 TABLET BY MOUTH ONCE DAILY  Indications: hypertension 90 Tab 1    inhalational spacing device 1 Each by Does Not Apply route as needed. 1 Device 0     No current facility-administered medications on file prior to visit. Reviewed PmHx, RxHx, FmHx, SocHx, AllgHx and updated and dated in the chart. Nurse notes were reviewed and are correct    Objective:     Vitals:    03/22/18 1519 03/22/18 1525   BP: 148/63 131/73   Pulse: 94    Resp: 18    Temp: 98 °F (36.7 °C)    TempSrc: Oral    SpO2: 99%    Weight: 117 lb (53.1 kg)    Height: 4' 9\" (1.448 m)      Physical Exam   Constitutional: She appears well-developed and well-nourished. No distress. HENT:   Head: Normocephalic and atraumatic. Right Ear: External ear normal.   Left Ear: External ear normal.   Nose: Nose normal.   Mouth/Throat: Oropharynx is clear and moist. No oropharyngeal exudate. Eyes: Conjunctivae and EOM are normal. Pupils are equal, round, and reactive to light. Neck: Normal range of motion. Neck supple. Cardiovascular: Normal rate, regular rhythm and normal heart sounds.   Exam reveals no gallop and no friction rub. No murmur heard. Pulmonary/Chest: Effort normal. No respiratory distress. She has wheezes (mild end exp wheezes). Lymphadenopathy:     She has no cervical adenopathy. Nursing note and vitals reviewed. Assessment/ Plan:     Diagnoses and all orders for this visit:    1. Bronchitis:  Is smoker and treating as COPD exacerbation because has needed this tx in the past.  -     albuterol (PROVENTIL HFA, VENTOLIN HFA, PROAIR HFA) 90 mcg/actuation inhaler; Take 2 Puffs by inhalation every six (6) hours as needed for Wheezing.  -     predniSONE (DELTASONE) 20 mg tablet; Take 2 Tabs by mouth daily (with breakfast) for 5 days. -     azithromycin (ZITHROMAX Z-BILL) 250 mg tablet; Take two tablets today then one tablet daily    2. Hypercholesteremia  -     simvastatin (ZOCOR) 20 mg tablet; TAKE 1 TABLET BY MOUTH NIGHTLY AT BEDTIME  Indications: hypercholesterolemia    3. Essential hypertension  -     losartan (COZAAR) 50 mg tablet; Take 1 Tab by mouth daily. 4. BPPV (benign paroxysmal positional vertigo), right  -     meclizine (ANTIVERT) 25 mg tablet; Take 1 Tab by mouth three (3) times daily as needed. Indications: Vertigo    5. Gastroesophageal reflux disease, esophagitis presence not specified  -     cetirizine (ZYRTEC) 5 mg tablet; Take 1 Tab by mouth daily as needed for Allergies. Indications: Allergic Rhinitis  -     Omeprazole delayed release (PRILOSEC D/R) 20 mg tablet; Take 1 Tab by mouth daily. 6. Arthritis  -     ibuprofen (MOTRIN) 800 mg tablet; Take 1 Tab by mouth every eight (8) hours as needed for Pain. 7. Chronic allergic rhinitis, unspecified seasonality, unspecified trigger       I have discussed the diagnosis with the patient and the intended plan as seen in the above orders. The patient verbalized understanding and agrees with the plan. Follow-up Disposition:  Return in about 6 months (around 9/22/2018) for HTN, arthritis, GERD.     Miguel Luke Cadence Jim MD

## 2018-03-22 NOTE — PATIENT INSTRUCTIONS
Bronchitis: Care Instructions  Your Care Instructions    Bronchitis is inflammation of the bronchial tubes, which carry air to the lungs. The tubes swell and produce mucus, or phlegm. The mucus and inflamed bronchial tubes make you cough. You may have trouble breathing. Most cases of bronchitis are caused by viruses like those that cause colds. Antibiotics usually do not help and they may be harmful. Bronchitis usually develops rapidly and lasts about 2 to 3 weeks in otherwise healthy people. Follow-up care is a key part of your treatment and safety. Be sure to make and go to all appointments, and call your doctor if you are having problems. It's also a good idea to know your test results and keep a list of the medicines you take. How can you care for yourself at home? · Take all medicines exactly as prescribed. Call your doctor if you think you are having a problem with your medicine. · Get some extra rest.  · Take an over-the-counter pain medicine, such as acetaminophen (Tylenol), ibuprofen (Advil, Motrin), or naproxen (Aleve) to reduce fever and relieve body aches. Read and follow all instructions on the label. · Do not take two or more pain medicines at the same time unless the doctor told you to. Many pain medicines have acetaminophen, which is Tylenol. Too much acetaminophen (Tylenol) can be harmful. · Take an over-the-counter cough medicine that contains dextromethorphan to help quiet a dry, hacking cough so that you can sleep. Avoid cough medicines that have more than one active ingredient. Read and follow all instructions on the label. · Breathe moist air from a humidifier, hot shower, or sink filled with hot water. The heat and moisture will thin mucus so you can cough it out. · Do not smoke. Smoking can make bronchitis worse. If you need help quitting, talk to your doctor about stop-smoking programs and medicines. These can increase your chances of quitting for good.   When should you call for help? Call 911 anytime you think you may need emergency care. For example, call if:  ? · You have severe trouble breathing. ?Call your doctor now or seek immediate medical care if:  ? · You have new or worse trouble breathing. ? · You cough up dark brown or bloody mucus (sputum). ? · You have a new or higher fever. ? · You have a new rash. ? Watch closely for changes in your health, and be sure to contact your doctor if:  ? · You cough more deeply or more often, especially if you notice more mucus or a change in the color of your mucus. ? · You are not getting better as expected. Where can you learn more? Go to http://kalpesh-melina.info/. Enter H333 in the search box to learn more about \"Bronchitis: Care Instructions. \"  Current as of: May 12, 2017  Content Version: 11.4  © 1801-2171 [employer]. Care instructions adapted under license by [employer] (which disclaims liability or warranty for this information). If you have questions about a medical condition or this instruction, always ask your healthcare professional. [address] any warranty or liability for your use of this information.

## 2018-05-22 ENCOUNTER — OFFICE VISIT (OUTPATIENT)
Dept: INTERNAL MEDICINE CLINIC | Age: 63
End: 2018-05-22

## 2018-05-22 VITALS
OXYGEN SATURATION: 98 % | RESPIRATION RATE: 18 BRPM | DIASTOLIC BLOOD PRESSURE: 72 MMHG | HEIGHT: 57 IN | BODY MASS INDEX: 23.95 KG/M2 | SYSTOLIC BLOOD PRESSURE: 132 MMHG | TEMPERATURE: 98 F | WEIGHT: 111 LBS | HEART RATE: 67 BPM

## 2018-05-22 DIAGNOSIS — H81.11 BPPV (BENIGN PAROXYSMAL POSITIONAL VERTIGO), RIGHT: ICD-10-CM

## 2018-05-22 DIAGNOSIS — E78.00 HYPERCHOLESTEREMIA: ICD-10-CM

## 2018-05-22 DIAGNOSIS — I10 ESSENTIAL HYPERTENSION: Primary | ICD-10-CM

## 2018-05-22 DIAGNOSIS — M19.90 ARTHRITIS: ICD-10-CM

## 2018-05-22 DIAGNOSIS — M72.2 PLANTAR FASCIITIS: ICD-10-CM

## 2018-05-22 DIAGNOSIS — K21.9 GASTROESOPHAGEAL REFLUX DISEASE, ESOPHAGITIS PRESENCE NOT SPECIFIED: ICD-10-CM

## 2018-05-22 DIAGNOSIS — J40 BRONCHITIS: ICD-10-CM

## 2018-05-22 RX ORDER — ALBUTEROL SULFATE 90 UG/1
2 AEROSOL, METERED RESPIRATORY (INHALATION)
Qty: 2 INHALER | Refills: 3 | Status: SHIPPED | OUTPATIENT
Start: 2018-05-22 | End: 2019-04-16 | Stop reason: SDUPTHER

## 2018-05-22 RX ORDER — PHENOL/SODIUM PHENOLATE
20 AEROSOL, SPRAY (ML) MUCOUS MEMBRANE DAILY
Qty: 90 TAB | Refills: 1 | Status: SHIPPED | OUTPATIENT
Start: 2018-05-22 | End: 2018-12-27 | Stop reason: SDUPTHER

## 2018-05-22 RX ORDER — MECLIZINE HYDROCHLORIDE 25 MG/1
25 TABLET ORAL
Qty: 270 TAB | Refills: 1 | Status: SHIPPED | OUTPATIENT
Start: 2018-05-22 | End: 2018-12-27 | Stop reason: SDUPTHER

## 2018-05-22 RX ORDER — SIMVASTATIN 20 MG/1
TABLET, FILM COATED ORAL
Qty: 90 TAB | Refills: 1 | Status: SHIPPED | OUTPATIENT
Start: 2018-05-22 | End: 2018-12-27 | Stop reason: SDUPTHER

## 2018-05-22 RX ORDER — CETIRIZINE HYDROCHLORIDE 5 MG/1
5 TABLET ORAL
Qty: 90 TAB | Refills: 1 | Status: SHIPPED | OUTPATIENT
Start: 2018-05-22 | End: 2018-12-27 | Stop reason: SDUPTHER

## 2018-05-22 RX ORDER — LOSARTAN POTASSIUM 50 MG/1
50 TABLET ORAL DAILY
Qty: 90 TAB | Refills: 1 | Status: SHIPPED | OUTPATIENT
Start: 2018-05-22 | End: 2018-12-27 | Stop reason: SDUPTHER

## 2018-05-22 RX ORDER — IBUPROFEN 800 MG/1
800 TABLET ORAL
Qty: 90 TAB | Refills: 1 | Status: SHIPPED | OUTPATIENT
Start: 2018-05-22 | End: 2018-12-27 | Stop reason: SDUPTHER

## 2018-05-22 NOTE — MR AVS SNAPSHOT
[address] [place] [address] 84 [phone] [place] [address] 
[phone] Patient: Yolande Alfaro MRN: [id number] [facility]:[id number]/[id number] Visit Information Date & Time Provider Department Dept. Phone Encounter #  
 5/22/2018 10:45 AM Sania Knutson MD [facility]  Follow-up Instructions Return in about 6 months (around 11/22/2018) for HTN, hyperlipidemia, fasting labs. Upcoming Health Maintenance Date Due COLONOSCOPY 1/16/1973 BREAST CANCER SCRN MAMMOGRAM 1/16/2005 Influenza Age 5 to Adult 8/1/2018 PAP AKA CERVICAL CYTOLOGY 5/11/2020 DTaP/Tdap/Td series (2 - Td) 4/28/2026 Allergies as of 5/22/2018  Review Complete On: 5/22/2018 By: Sania Knutson MD  
 No Known Allergies Current Immunizations  Reviewed on 10/13/2016 Name Date Influenza Vaccine (Quad) PF 10/13/2016, 10/23/2015 Tdap 4/28/2016 Not reviewed this visit You Were Diagnosed With   
  
 Codes Comments Essential hypertension    -  Primary ICD-10-CM: I10 
ICD-9-CM: 401.9 Hypercholesteremia     ICD-10-CM: E78.00 ICD-9-CM: 272.0 BPPV (benign paroxysmal positional vertigo), right     ICD-10-CM: H81.11 
ICD-9-CM: 386.11 Bronchitis     ICD-10-CM: J40 ICD-9-CM: 019 Gastroesophageal reflux disease, esophagitis presence not specified     ICD-10-CM: K21.9 ICD-9-CM: 530.81 Arthritis     ICD-10-CM: M19.90 ICD-9-CM: 716.90 Plantar fasciitis     ICD-10-CM: M72.2 ICD-9-CM: 728.71 Vitals BP Pulse Temp Resp Height(growth percentile) Weight(growth percentile) 132/72 (BP 1 Location: Left arm, BP Patient Position: Sitting) 67 98 °F (36.7 °C) (Oral) 18 4' 9\" (1.448 m) 111 lb (50.3 kg) SpO2 BMI OB Status Smoking Status 98% 24.02 kg/m2 Postmenopausal Former Smoker BMI and BSA Data Body Mass Index Body Surface Area 24.02 kg/m 2 1.42 m 2 Preferred Pharmacy Pharmacy Name Phone Alejandrina Oliveira [facility], [address] [phone] Your Updated Medication List  
  
   
This list is accurate as of 5/22/18 11:39 AM.  Always use your most recent med list.  
  
  
  
  
 albuterol 90 mcg/actuation inhaler Commonly known as:  PROVENTIL HFA, VENTOLIN HFA, PROAIR HFA Take 2 Puffs by inhalation every six (6) hours as needed for Wheezing. cetirizine 5 mg tablet Commonly known as:  ZYRTEC Take 1 Tab by mouth daily as needed for Allergies. Indications: Allergic Rhinitis  
  
 ibuprofen 800 mg tablet Commonly known as:  MOTRIN Take 1 Tab by mouth every eight (8) hours as needed for Pain.  
  
 inhalational spacing device 1 Each by Does Not Apply route as needed. losartan 50 mg tablet Commonly known as:  COZAAR Take 1 Tab by mouth daily. meclizine 25 mg tablet Commonly known as:  ANTIVERT Take 1 Tab by mouth three (3) times daily as needed. Indications: Vertigo Omeprazole delayed release 20 mg tablet Commonly known as:  PRILOSEC D/R Take 1 Tab by mouth daily. simvastatin 20 mg tablet Commonly known as:  ZOCOR  
TAKE 1 TABLET BY MOUTH NIGHTLY AT BEDTIME  Indications: hypercholesterolemia Prescriptions Sent to Pharmacy Refills  
 simvastatin (ZOCOR) 20 mg tablet 1 Sig: TAKE 1 TABLET BY MOUTH NIGHTLY AT BEDTIME  Indications: hypercholesterolemia Class: Normal  
 Pharmacy: [address], [address] Ph #: [phone]  
 losartan (COZAAR) 50 mg tablet 1 Sig: Take 1 Tab by mouth daily. Class: Normal  
 Pharmacy: [address], [address] [place] [address] Ph #: [phone] Route: Oral  
 meclizine (ANTIVERT) 25 mg tablet 1 Sig: Take 1 Tab by mouth three (3) times daily as needed. Indications: Vertigo  Class: Normal  
 Pharmacy: [address], [address] Ph #: [phone] Route: Oral  
 albuterol (PROVENTIL HFA, VENTOLIN HFA, PROAIR HFA) 90 mcg/actuation inhaler 3 Sig: Take 2 Puffs by inhalation every six (6) hours as needed for Wheezing. Class: Normal  
 Pharmacy: [address] [place] [address]  [address], [address] Ph #: [phone] Route: Inhalation  
 cetirizine (ZYRTEC) 5 mg tablet 1 Sig: Take 1 Tab by mouth daily as needed for Allergies. Indications: Allergic Rhinitis Class: Normal  
 Pharmacy: [address], [address] [place] [address] Ph #: [phone] Route: Oral  
 Omeprazole delayed release (PRILOSEC D/R) 20 mg tablet 1 Sig: Take 1 Tab by mouth daily. Class: Normal  
 Pharmacy: [address], [address] [place] [address] Ph #: [phone] Route: Oral  
 ibuprofen (MOTRIN) 800 mg tablet 1 Sig: Take 1 Tab by mouth every eight (8) hours as needed for Pain. Class: Normal  
 Pharmacy: [address], [address] Ph #: [phone] Route: Oral  
  
Follow-up Instructions Return in about 6 months (around 11/22/2018) for HTN, hyperlipidemia, fasting labs. Patient Instructions Plantar Fasciitis: Care Instructions Your Care Instructions Plantar fasciitis is pain and inflammation of the plantar fascia, the tissue at the bottom of your foot that connects the heel bone to the toes. The plantar fascia also supports the arch. If you strain the plantar fascia, it can develop small tears and cause heel pain when you stand or walk. Plantar fasciitis can be caused by running or other sports. It also may occur in people who are overweight or who have high arches or flat feet. You may get plantar fasciitis if you walk or stand for long periods, or have a tight Achilles tendon or calf muscles. You can improve your foot pain with rest and other care at home. It might take a few weeks to a few months for your foot to heal completely. Follow-up care is a key part of your treatment and safety. Be sure to make and go to all appointments, and call your doctor if you are having problems. It's also a good idea to know your test results and keep a list of the medicines you take. How can you care for yourself at home? · Rest your feet often. Reduce your activity to a level that lets you avoid pain. If possible, do not run or walk on hard surfaces. · Take pain medicines exactly as directed. ¨ If the doctor gave you a prescription medicine for pain, take it as prescribed. ¨ If you are not taking a prescription pain medicine, take an over-the-counter anti-inflammatory medicine for pain and swelling, such as ibuprofen (Advil, Motrin) or naproxen (Aleve). Read and follow all instructions on the label. · Use ice massage to help with pain and swelling. You can use an ice cube or an ice cup several times a day. To make an ice cup, fill a paper cup with water and freeze it. Cut off the top of the cup until a half-inch of ice shows. Hold onto the remaining paper to use the cup. Rub the ice in small circles over the area for 5 to 7 minutes. · Contrast baths, which alternate hot and cold water, can also help reduce swelling. But because heat alone may make pain and swelling worse, end a contrast bath with a soak in cold water. · Wear a night splint if your doctor suggests it. A night splint holds your foot with the toes pointed up and the foot and ankle at a 90-degree angle. This position gives the bottom of your foot a constant, gentle stretch. · Do simple exercises such as calf stretches and towel stretches 2 to 3 times each day, especially when you first get up in the morning. These can help the plantar fascia become more flexible.  They also make the muscles that support your arch stronger. Hold these stretches for 15 to 30 seconds per stretch. Repeat 2 to 4 times. ¨ Stand about 1 foot from a wall. Place the palms of both hands against the wall at chest level. Lean forward against the wall, keeping one leg with the knee straight and heel on the ground while bending the knee of the other leg. ¨ Sit down on the floor or a mat with your feet stretched in front of you. Roll up a towel lengthwise, and loop it over the ball of your foot. Holding the towel at both ends, gently pull the towel toward you to stretch your foot. · Wear shoes with good arch support. Athletic shoes or shoes with a well-cushioned sole are good choices. · Try heel cups or shoe inserts (orthotics) to help cushion your heel. You can buy these at many shoe stores. · Put on your shoes as soon as you get out of bed. Going barefoot or wearing slippers may make your pain worse. · Reach and stay at a good weight for your height. This puts less strain on your feet. When should you call for help? Call your doctor now or seek immediate medical care if: 
· You have heel pain with fever, redness, or warmth in your heel. · You cannot put weight on the sore foot. Watch closely for changes in your health, and be sure to contact your doctor if: 
· You have numbness or tingling in your heel. · Your heel pain lasts more than 2 weeks. Where can you learn more? Go to http://kalpesh-melina.info/. Teryl Lipoma in the search box to learn more about \"Plantar Fasciitis: Care Instructions. \" Current as of: March 21, 2017 Content Version: 11.4 © 6778-9126 [employer]. Care instructions adapted under license by [employer] (which disclaims liability or warranty for this information).  If you have questions about a medical condition or this instruction, always ask your healthcare professional. [address] any warranty or liability for your use of this information. Plantar Fasciitis: Exercises Your Care Instructions Here are some examples of typical rehabilitation exercises for your condition. Start each exercise slowly. Ease off the exercise if you start to have pain. Your doctor or physical therapist will tell you when you can start these exercises and which ones will work best for you. How to do the exercises Towel stretch 1. Sit with your legs extended and knees straight. 2. Place a towel around your foot just under the toes. 3. Hold each end of the towel in each hand, with your hands above your knees. 4. Pull back with the towel so that your foot stretches toward you. 5. Hold the position for at least 15 to 30 seconds. 6. Repeat 2 to 4 times a session, up to 5 sessions a day. Calf stretch This exercise stretches the muscles at the back of the lower leg (the calf) and the Achilles tendon. Do this exercise 3 or 4 times a day, 5 days a week. 1. Stand facing a wall with your hands on the wall at about eye level. Put the leg you want to stretch about a step behind your other leg. 2. Keeping your back heel on the floor, bend your front knee until you feel a stretch in the back leg. 3. Hold the stretch for 15 to 30 seconds. Repeat 2 to 4 times. Plantar fascia and calf stretch Stretching the plantar fascia and calf muscles can increase flexibility and decrease heel pain. You can do this exercise several times each day and before and after activity. 1. Stand on a step as shown above. Be sure to hold on to the banister. 2. Slowly let your heels down over the edge of the step as you relax your calf muscles. You should feel a gentle stretch across the bottom of your foot and up the back of your leg to your knee. 3. Hold the stretch about 15 to 30 seconds, and then tighten your calf muscle a little to bring your heel back up to the level of the step. Repeat 2 to 4 times. Towel curls Make this exercise more challenging by placing a weighted object, such as a soup can, on the other end of the towel. 1. While sitting, place your foot on a towel on the floor and scrunch the towel toward you with your toes. 2. Then, also using your toes, push the towel away from you. [place] pickups 1. Put marbles on the floor next to a cup. 
2. Using your toes, try to lift the marbles up from the floor and put them in the cup. Follow-up care is a key part of your treatment and safety. Be sure to make and go to all appointments, and call your doctor if you are having problems. It's also a good idea to know your test results and keep a list of the medicines you take. Where can you learn more? Go to http://kalpesh-melina.info/. Raquel Fowler in the search box to learn more about \"Plantar Fasciitis: Exercises. \" Current as of: March 21, 2017 Content Version: 11.4 © 6261-4883 [employer]. Care instructions adapted under license by [employer] (which disclaims liability or warranty for this information). If you have questions about a medical condition or this instruction, always ask your healthcare professional. [address] any warranty or liability for your use of this information. Introducing [facility]! Dear Kaushik Gilbert: Thank you for requesting a [employer] account. Our records indicate that you already have an active [employer] account. You can access your account anytime at [email or web address] Did you know that you can access your hospital and ER discharge instructions at any time in [employer]? You can also review all of your test results from your hospital stay or ER visit. Additional Information If you have questions, please visit the Frequently Asked Questions section of the [employer] website at [email or web address] Remember, [employer] is NOT to be used for urgent needs.  For medical emergencies, dial 911. Now available from your iPhone and Android! Please provide this summary of care documentation to your next provider. Your primary care clinician is listed as Jarvis Solitario. If you have any questions after today's visit, please call [phone].

## 2018-05-22 NOTE — LETTER
5/22/2018 11:15 AM 
 
Ms. Lanier [facility] [address] [facility] [address] [facility] [address] To Whom it May Concern; Ms. 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  As a result, she has difficulty walking and cannot shop or  prescriptions on her own and needs an authorized assistant (her son, Amy Mckenzie) [place] 
  
 
Sincerely, 
 
 
Sania Knutson MD

## 2018-05-22 NOTE — PROGRESS NOTES
Chief Complaint   Patient presents with    Medication Refill    Foot Pain     B/L foot pain     1. Have you been to the ER, urgent care clinic since your last visit? Hospitalized since your last visit? No    2. Have you seen or consulted any other health care providers outside of the [facility] since your last visit? Include any pap smears or colon screening.  No

## 2018-05-22 NOTE — PROGRESS NOTES
Subjective:   Patient is a 61y.o. year old female who presents for Medication Refill and Foot Pain (B/L foot pain)  1. HTN:  Stable on Losartan    2. Hyperlipidemia:  On Zocor 20 mg. No side effects. Taking daily. 3.  Vertigo:  Takes Antivert TID scheduled. Has had to do this chronically. 4.  Wheezing:  Has had recurrent bronchitis and asthma. Is still having to use inhaler sometimes prn since the last bronchitis episode. Not needing every day. 5.  Allergic rhinitis:  On Zyrtec daily. Good control    6. GERD:  On Prilosec daily. Good control    7. Foot pain:  She takes Motrin 800 mg for that prn. In the left foot. Pain is in the bottom of the foot. Gets some cramping sometimes in the toes as well. Review of Systems   Constitutional: Negative. Cardiovascular: Negative. Current Outpatient Prescriptions on File Prior to Visit   Medication Sig Dispense Refill    inhalational spacing device 1 Each by Does Not Apply route as needed. 1 Device 0     No current facility-administered medications on file prior to visit. Reviewed PmHx, RxHx, FmHx, SocHx, AllgHx and updated and dated in the chart. Nurse notes were reviewed and are correct    Objective:     Vitals:    05/22/18 1048   BP: 132/72   Pulse: 67   Resp: 18   Temp: 98 °F (36.7 °C)   TempSrc: Oral   SpO2: 98%   Weight: 111 lb (50.3 kg)   Height: 4' 9\" (1.448 m)     Physical Exam   Constitutional: She is oriented to person, place, and time. She appears well-developed and well-nourished. No distress. HENT:   Head: Normocephalic and atraumatic. Right Ear: External ear normal.   Left Ear: External ear normal.   Nose: Nose normal.   Mouth/Throat: Oropharynx is clear and moist.   Eyes: EOM are normal. Pupils are equal, round, and reactive to light. Neck: Normal range of motion. Neck supple. Carotid bruit is not present. No tracheal deviation present.    Cardiovascular: Normal rate, regular rhythm, normal heart sounds and intact distal pulses. Exam reveals no gallop and no friction rub. No murmur heard. Pulmonary/Chest: Effort normal and breath sounds normal. She has no wheezes. She has no rales. Abdominal: Soft. Bowel sounds are normal. She exhibits no distension. There is no tenderness. Musculoskeletal: She exhibits no edema or tenderness. Left foot:  Tender in heal over plantar fascia insertion. Otherwise normal exam   Lymphadenopathy:     She has no cervical adenopathy. Neurological: She is alert and oriented to person, place, and time. Skin: Skin is warm and dry. Psychiatric: She has a normal mood and affect. Her behavior is normal.   Nursing note and vitals reviewed. Assessment/ Plan:     Diagnoses and all orders for this visit:    1. Essential hypertension  -     losartan (COZAAR) 50 mg tablet; Take 1 Tab by mouth daily. 2. Hypercholesteremia  -     simvastatin (ZOCOR) 20 mg tablet; TAKE 1 TABLET BY MOUTH NIGHTLY AT BEDTIME  Indications: hypercholesterolemia    3. BPPV (benign paroxysmal positional vertigo), right  -     meclizine (ANTIVERT) 25 mg tablet; Take 1 Tab by mouth three (3) times daily as needed. Indications: Vertigo    4. Bronchitis  -     albuterol (PROVENTIL HFA, VENTOLIN HFA, PROAIR HFA) 90 mcg/actuation inhaler; Take 2 Puffs by inhalation every six (6) hours as needed for Wheezing. 5. Gastroesophageal reflux disease, esophagitis presence not specified  -     cetirizine (ZYRTEC) 5 mg tablet; Take 1 Tab by mouth daily as needed for Allergies. Indications: Allergic Rhinitis  -     Omeprazole delayed release (PRILOSEC D/R) 20 mg tablet; Take 1 Tab by mouth daily. 6. Arthritis  -     ibuprofen (MOTRIN) 800 mg tablet; Take 1 Tab by mouth every eight (8) hours as needed for Pain. 7. Plantar fasciitis:  Gave handout. Recommended usually exercises and other measures. Recommended using the Motrin if needed for this prn.        I have discussed the diagnosis with the patient and the intended plan as seen in the above orders. The patient verbalized understanding and agrees with the plan. Follow-up Disposition:  Return in about 6 months (around 11/22/2018) for HTN, hyperlipidemia, fasting labs.     Addy York MD

## 2018-05-22 NOTE — PATIENT INSTRUCTIONS
Plantar Fasciitis: Care Instructions  Your Care Instructions    Plantar fasciitis is pain and inflammation of the plantar fascia, the tissue at the bottom of your foot that connects the heel bone to the toes. The plantar fascia also supports the arch. If you strain the plantar fascia, it can develop small tears and cause heel pain when you stand or walk. Plantar fasciitis can be caused by running or other sports. It also may occur in people who are overweight or who have high arches or flat feet. You may get plantar fasciitis if you walk or stand for long periods, or have a tight Achilles tendon or calf muscles. You can improve your foot pain with rest and other care at home. It might take a few weeks to a few months for your foot to heal completely. Follow-up care is a key part of your treatment and safety. Be sure to make and go to all appointments, and call your doctor if you are having problems. It's also a good idea to know your test results and keep a list of the medicines you take. How can you care for yourself at home? · Rest your feet often. Reduce your activity to a level that lets you avoid pain. If possible, do not run or walk on hard surfaces. · Take pain medicines exactly as directed. ¨ If the doctor gave you a prescription medicine for pain, take it as prescribed. ¨ If you are not taking a prescription pain medicine, take an over-the-counter anti-inflammatory medicine for pain and swelling, such as ibuprofen (Advil, Motrin) or naproxen (Aleve). Read and follow all instructions on the label. · Use ice massage to help with pain and swelling. You can use an ice cube or an ice cup several times a day. To make an ice cup, fill a paper cup with water and freeze it. Cut off the top of the cup until a half-inch of ice shows. Hold onto the remaining paper to use the cup. Rub the ice in small circles over the area for 5 to 7 minutes.   · Contrast baths, which alternate hot and cold water, can also help reduce swelling. But because heat alone may make pain and swelling worse, end a contrast bath with a soak in cold water. · Wear a night splint if your doctor suggests it. A night splint holds your foot with the toes pointed up and the foot and ankle at a 90-degree angle. This position gives the bottom of your foot a constant, gentle stretch. · Do simple exercises such as calf stretches and towel stretches 2 to 3 times each day, especially when you first get up in the morning. These can help the plantar fascia become more flexible. They also make the muscles that support your arch stronger. Hold these stretches for 15 to 30 seconds per stretch. Repeat 2 to 4 times. ¨ Stand about 1 foot from a wall. Place the palms of both hands against the wall at chest level. Lean forward against the wall, keeping one leg with the knee straight and heel on the ground while bending the knee of the other leg. ¨ Sit down on the floor or a mat with your feet stretched in front of you. Roll up a towel lengthwise, and loop it over the ball of your foot. Holding the towel at both ends, gently pull the towel toward you to stretch your foot. · Wear shoes with good arch support. Athletic shoes or shoes with a well-cushioned sole are good choices. · Try heel cups or shoe inserts (orthotics) to help cushion your heel. You can buy these at many shoe stores. · Put on your shoes as soon as you get out of bed. Going barefoot or wearing slippers may make your pain worse. · Reach and stay at a good weight for your height. This puts less strain on your feet. When should you call for help? Call your doctor now or seek immediate medical care if:  · You have heel pain with fever, redness, or warmth in your heel. · You cannot put weight on the sore foot. Watch closely for changes in your health, and be sure to contact your doctor if:  · You have numbness or tingling in your heel. · Your heel pain lasts more than 2 weeks.   Where can you learn more? Go to http://kalpesh-melina.info/. Alisa Showers in the search box to learn more about \"Plantar Fasciitis: Care Instructions. \"  Current as of: March 21, 2017  Content Version: 11.4  © 1367-7688 [employer]. Care instructions adapted under license by [employer] (which disclaims liability or warranty for this information). If you have questions about a medical condition or this instruction, always ask your healthcare professional. [address] any warranty or liability for your use of this information. Plantar Fasciitis: Exercises  Your Care Instructions  Here are some examples of typical rehabilitation exercises for your condition. Start each exercise slowly. Ease off the exercise if you start to have pain. Your doctor or physical therapist will tell you when you can start these exercises and which ones will work best for you. How to do the exercises  Towel stretch    1. Sit with your legs extended and knees straight. 2. Place a towel around your foot just under the toes. 3. Hold each end of the towel in each hand, with your hands above your knees. 4. Pull back with the towel so that your foot stretches toward you. 5. Hold the position for at least 15 to 30 seconds. 6. Repeat 2 to 4 times a session, up to 5 sessions a day. Calf stretch    This exercise stretches the muscles at the back of the lower leg (the calf) and the Achilles tendon. Do this exercise 3 or 4 times a day, 5 days a week. 1. Stand facing a wall with your hands on the wall at about eye level. Put the leg you want to stretch about a step behind your other leg. 2. Keeping your back heel on the floor, bend your front knee until you feel a stretch in the back leg. 3. Hold the stretch for 15 to 30 seconds. Repeat 2 to 4 times. Plantar fascia and calf stretch    Stretching the plantar fascia and calf muscles can increase flexibility and decrease heel pain.  You can do this exercise several times each day and before and after activity. 1. Stand on a step as shown above. Be sure to hold on to the banister. 2. Slowly let your heels down over the edge of the step as you relax your calf muscles. You should feel a gentle stretch across the bottom of your foot and up the back of your leg to your knee. 3. Hold the stretch about 15 to 30 seconds, and then tighten your calf muscle a little to bring your heel back up to the level of the step. Repeat 2 to 4 times. Towel curls    Make this exercise more challenging by placing a weighted object, such as a soup can, on the other end of the towel. 1. While sitting, place your foot on a towel on the floor and scrunch the towel toward you with your toes. 2. Then, also using your toes, push the towel away from you. [place] pickups    1. Put marbles on the floor next to a cup.  2. Using your toes, try to lift the marbles up from the floor and put them in the cup. Follow-up care is a key part of your treatment and safety. Be sure to make and go to all appointments, and call your doctor if you are having problems. It's also a good idea to know your test results and keep a list of the medicines you take. Where can you learn more? Go to http://kalpesh-melina.info/. Felix Hernandez in the search box to learn more about \"Plantar Fasciitis: Exercises. \"  Current as of: March 21, 2017  Content Version: 11.4  © 4736-0306 [employer]. Care instructions adapted under license by [employer] (which disclaims liability or warranty for this information). If you have questions about a medical condition or this instruction, always ask your healthcare professional. [address] any warranty or liability for your use of this information.

## 2018-10-22 ENCOUNTER — PATIENT OUTREACH (OUTPATIENT)
Dept: INTERNAL MEDICINE CLINIC | Age: 63
End: 2018-10-22

## 2018-10-23 ENCOUNTER — TELEPHONE (OUTPATIENT)
Dept: INTERNAL MEDICINE CLINIC | Age: 63
End: 2018-10-23

## 2018-10-23 ENCOUNTER — PATIENT OUTREACH (OUTPATIENT)
Dept: INTERNAL MEDICINE CLINIC | Age: 63
End: 2018-10-23

## 2018-10-23 DIAGNOSIS — Z12.31 BREAST CANCER SCREENING BY MAMMOGRAM: Primary | ICD-10-CM

## 2018-10-23 NOTE — PROGRESS NOTES
[facility] health screenings:    Spoke with Ms Nita Nogueira initially regarding health screenings but language barrier caused April, her sister in law, to speak with me. She has completed her colonoscopy last year through [facility]. I will attempt to locate that report. April will call the office to schedule a well woman visit for her and I've placed referral for mammogram and informed April they can expect a call from the scheduling nurse.

## 2018-10-24 DIAGNOSIS — Z12.31 BREAST CANCER SCREENING BY MAMMOGRAM: ICD-10-CM

## 2018-12-04 ENCOUNTER — PATIENT OUTREACH (OUTPATIENT)
Dept: INTERNAL MEDICINE CLINIC | Age: 63
End: 2018-12-04

## 2018-12-04 NOTE — PROGRESS NOTES
NN health screening:    Noted pt has chosen to complete her mammogram through [facility]. Will continue to follow. Has not scheduled well woman appt yet. Will f/u after the holidays.

## 2018-12-27 ENCOUNTER — OFFICE VISIT (OUTPATIENT)
Dept: INTERNAL MEDICINE CLINIC | Age: 63
End: 2018-12-27

## 2018-12-27 VITALS
HEIGHT: 57 IN | WEIGHT: 110 LBS | BODY MASS INDEX: 23.73 KG/M2 | TEMPERATURE: 97.8 F | RESPIRATION RATE: 18 BRPM | SYSTOLIC BLOOD PRESSURE: 135 MMHG | HEART RATE: 64 BPM | OXYGEN SATURATION: 99 % | DIASTOLIC BLOOD PRESSURE: 72 MMHG

## 2018-12-27 DIAGNOSIS — H81.11 BPPV (BENIGN PAROXYSMAL POSITIONAL VERTIGO), RIGHT: ICD-10-CM

## 2018-12-27 DIAGNOSIS — R73.09 ELEVATED GLUCOSE: ICD-10-CM

## 2018-12-27 DIAGNOSIS — K21.9 GASTROESOPHAGEAL REFLUX DISEASE, ESOPHAGITIS PRESENCE NOT SPECIFIED: ICD-10-CM

## 2018-12-27 DIAGNOSIS — L29.9 ITCHING: Primary | ICD-10-CM

## 2018-12-27 DIAGNOSIS — I10 ESSENTIAL HYPERTENSION: ICD-10-CM

## 2018-12-27 DIAGNOSIS — M19.90 ARTHRITIS: ICD-10-CM

## 2018-12-27 DIAGNOSIS — E78.00 HYPERCHOLESTEREMIA: ICD-10-CM

## 2018-12-27 RX ORDER — CETIRIZINE HYDROCHLORIDE 5 MG/1
5 TABLET ORAL
Qty: 90 TAB | Refills: 1 | Status: SHIPPED | OUTPATIENT
Start: 2018-12-27 | End: 2019-04-16 | Stop reason: SDUPTHER

## 2018-12-27 RX ORDER — PHENOL/SODIUM PHENOLATE
20 AEROSOL, SPRAY (ML) MUCOUS MEMBRANE DAILY
Qty: 90 TAB | Refills: 1 | Status: SHIPPED | OUTPATIENT
Start: 2018-12-27 | End: 2019-04-16 | Stop reason: SDUPTHER

## 2018-12-27 RX ORDER — SIMVASTATIN 20 MG/1
TABLET, FILM COATED ORAL
Qty: 90 TAB | Refills: 1 | Status: SHIPPED | OUTPATIENT
Start: 2018-12-27 | End: 2019-04-16 | Stop reason: SDUPTHER

## 2018-12-27 RX ORDER — LOSARTAN POTASSIUM 50 MG/1
50 TABLET ORAL DAILY
Qty: 90 TAB | Refills: 1 | Status: SHIPPED | OUTPATIENT
Start: 2018-12-27 | End: 2019-04-16 | Stop reason: SDUPTHER

## 2018-12-27 RX ORDER — MECLIZINE HYDROCHLORIDE 25 MG/1
25 TABLET ORAL
Qty: 270 TAB | Refills: 1 | Status: SHIPPED | OUTPATIENT
Start: 2018-12-27 | End: 2019-04-16 | Stop reason: SDUPTHER

## 2018-12-27 RX ORDER — CLOTRIMAZOLE AND BETAMETHASONE DIPROPIONATE 10; .64 MG/G; MG/G
CREAM TOPICAL
Qty: 45 G | Refills: 1 | Status: SHIPPED | OUTPATIENT
Start: 2018-12-27 | End: 2020-04-24 | Stop reason: ALTCHOICE

## 2018-12-27 RX ORDER — IBUPROFEN 800 MG/1
800 TABLET ORAL
Qty: 90 TAB | Refills: 1 | Status: SHIPPED | OUTPATIENT
Start: 2018-12-27 | End: 2019-04-16 | Stop reason: SDUPTHER

## 2018-12-27 NOTE — PROGRESS NOTES
HISTORY OF PRESENT ILLNESS  Maximo Siddiqi is a 61 y.o. female. HPI Ms. Demond Barboza is here for follow up on HTN and hypercholesterolemia. She states she has been working on weight loss and is down 7 lbs from March. She denies any chest pain, leg swelling or shortness of breath. She is c/o itching of her feet however. She is also still smoking and not motivated to quit. She smokes about 1/3 ppd. Review of Systems   Constitutional: Negative. Respiratory: Negative. Cardiovascular: Negative. Gastrointestinal: Negative for heartburn (controlled with prilosec). Genitourinary: Negative. Musculoskeletal: Positive for joint pain (takes motrin prn. Cautioned her about chronic use of nsaids and effect on kidneys and BP). Skin: Positive for itching (skin of feet and between toes). Psychiatric/Behavioral: Negative. Physical Exam   Constitutional: She is oriented to person, place, and time. She appears well-developed and well-nourished. No distress. HENT:   Head: Normocephalic and atraumatic. Eyes: Conjunctivae are normal.   Cardiovascular: Normal rate, regular rhythm and intact distal pulses. No murmur heard. Pulmonary/Chest: Effort normal and breath sounds normal. She has no wheezes. Musculoskeletal: She exhibits no edema. There is no rash on her feet, but she is itching her feet on the top of her foot and between her toes. Skin is not dry. She uses a moisturizer. Neurological: She is alert and oriented to person, place, and time. Psychiatric: She has a normal mood and affect.  Her behavior is normal. Judgment and thought content normal.     Visit Vitals  /72 (BP 1 Location: Left arm, BP Patient Position: Sitting)   Pulse 64   Temp 97.8 °F (36.6 °C) (Oral)   Resp 18   Ht 4' 9\" (1.448 m)   Wt 110 lb (49.9 kg)   SpO2 99%   BMI 23.80 kg/m²     Wt Readings from Last 3 Encounters:   12/27/18 110 lb (49.9 kg)   05/22/18 111 lb (50.3 kg)   03/22/18 117 lb (53.1 kg) ASSESSMENT and PLAN    ICD-10-CM ICD-9-CM    1. Itching L29.9 698.9 clotrimazole-betamethasone (LOTRISONE) topical cream   2. Hypercholesteremia E78.00 272.0 LIPID PANEL      simvastatin (ZOCOR) 20 mg tablet   3. Essential hypertension I10 401.9 COLLECTION VENOUS BLOOD,VENIPUNCTURE      METABOLIC PANEL, COMPREHENSIVE      losartan (COZAAR) 50 mg tablet   4. BPPV (benign paroxysmal positional vertigo), right H81.11 386.11 meclizine (ANTIVERT) 25 mg tablet   5. Gastroesophageal reflux disease, esophagitis presence not specified K21.9 530.81 cetirizine (ZYRTEC) 5 mg tablet      Omeprazole delayed release (PRILOSEC D/R) 20 mg tablet   6. Arthritis M19.90 716.90 ibuprofen (MOTRIN) 800 mg tablet   7. Elevated glucose R73.09 790.29 HEMOGLOBIN A1C W/O EAG   Discussed risks of smoking and adverse effects on health. Encourage smoking cessation. Pt verbalized understanding of their condition and diagnoses, treatment plan,  as well as side effects of any new medications prescribed.

## 2018-12-27 NOTE — PATIENT INSTRUCTIONS
The patient was advised that NSAID-type medications have two very important potential side effects: gastrointestinal irritation including hemorrhage and renal injuries. She was asked to take the medication with food and to stop if she experiences any GI upset. I asked her to call for vomiting, abdominal pain or black/bloody stools. The patient expresses understanding of these issues and questions were answered.

## 2018-12-28 LAB
ALBUMIN SERPL-MCNC: 4.6 G/DL (ref 3.6–4.8)
ALBUMIN/GLOB SERPL: 2.1 {RATIO} (ref 1.2–2.2)
ALP SERPL-CCNC: 95 IU/L (ref 39–117)
ALT SERPL-CCNC: 16 IU/L (ref 0–32)
AST SERPL-CCNC: 19 IU/L (ref 0–40)
BILIRUB SERPL-MCNC: 0.2 MG/DL (ref 0–1.2)
BUN SERPL-MCNC: 19 MG/DL (ref 8–27)
BUN/CREAT SERPL: 16 (ref 12–28)
CALCIUM SERPL-MCNC: 9.3 MG/DL (ref 8.7–10.3)
CHLORIDE SERPL-SCNC: 109 MMOL/L (ref 96–106)
CHOLEST SERPL-MCNC: 186 MG/DL (ref 100–199)
CO2 SERPL-SCNC: 22 MMOL/L (ref 20–29)
CREAT SERPL-MCNC: 1.22 MG/DL (ref 0.57–1)
GLOBULIN SER CALC-MCNC: 2.2 G/DL (ref 1.5–4.5)
GLUCOSE SERPL-MCNC: 105 MG/DL (ref 65–99)
HBA1C MFR BLD: 6 % (ref 4.8–5.6)
HDLC SERPL-MCNC: 73 MG/DL
LDLC SERPL CALC-MCNC: 91 MG/DL (ref 0–99)
POTASSIUM SERPL-SCNC: 4.5 MMOL/L (ref 3.5–5.2)
PROT SERPL-MCNC: 6.8 G/DL (ref 6–8.5)
SODIUM SERPL-SCNC: 145 MMOL/L (ref 134–144)
TRIGL SERPL-MCNC: 108 MG/DL (ref 0–149)
VLDLC SERPL CALC-MCNC: 22 MG/DL (ref 5–40)

## 2019-02-08 ENCOUNTER — PATIENT OUTREACH (OUTPATIENT)
Dept: INTERNAL MEDICINE CLINIC | Age: 64
End: 2019-02-08

## 2019-04-02 ENCOUNTER — PATIENT OUTREACH (OUTPATIENT)
Dept: INTERNAL MEDICINE CLINIC | Age: 64
End: 2019-04-02

## 2019-04-16 DIAGNOSIS — K21.9 GASTROESOPHAGEAL REFLUX DISEASE, ESOPHAGITIS PRESENCE NOT SPECIFIED: ICD-10-CM

## 2019-04-16 DIAGNOSIS — M19.90 ARTHRITIS: ICD-10-CM

## 2019-04-16 DIAGNOSIS — I10 ESSENTIAL HYPERTENSION: ICD-10-CM

## 2019-04-16 DIAGNOSIS — J40 BRONCHITIS: ICD-10-CM

## 2019-04-16 DIAGNOSIS — E78.00 HYPERCHOLESTEREMIA: ICD-10-CM

## 2019-04-16 DIAGNOSIS — L29.9 ITCHING: ICD-10-CM

## 2019-04-16 DIAGNOSIS — H81.11 BPPV (BENIGN PAROXYSMAL POSITIONAL VERTIGO), RIGHT: ICD-10-CM

## 2019-04-16 RX ORDER — IBUPROFEN 800 MG/1
800 TABLET ORAL
Qty: 90 TAB | Refills: 1 | Status: SHIPPED | OUTPATIENT
Start: 2019-04-16 | End: 2019-08-08 | Stop reason: SDUPTHER

## 2019-04-16 RX ORDER — MECLIZINE HYDROCHLORIDE 25 MG/1
25 TABLET ORAL
Qty: 270 TAB | Refills: 0 | Status: SHIPPED | OUTPATIENT
Start: 2019-04-16 | End: 2019-08-08 | Stop reason: SDUPTHER

## 2019-04-16 RX ORDER — LOSARTAN POTASSIUM 50 MG/1
50 TABLET ORAL DAILY
Qty: 90 TAB | Refills: 0 | Status: SHIPPED | OUTPATIENT
Start: 2019-04-16 | End: 2019-08-08 | Stop reason: SDUPTHER

## 2019-04-16 RX ORDER — PHENOL/SODIUM PHENOLATE
20 AEROSOL, SPRAY (ML) MUCOUS MEMBRANE DAILY
Qty: 90 TAB | Refills: 0 | Status: SHIPPED | OUTPATIENT
Start: 2019-04-16 | End: 2019-08-08 | Stop reason: SDUPTHER

## 2019-04-16 RX ORDER — SIMVASTATIN 20 MG/1
TABLET, FILM COATED ORAL
Qty: 90 TAB | Refills: 0 | Status: SHIPPED | OUTPATIENT
Start: 2019-04-16 | End: 2019-08-08 | Stop reason: SDUPTHER

## 2019-04-16 RX ORDER — ALBUTEROL SULFATE 90 UG/1
2 AEROSOL, METERED RESPIRATORY (INHALATION)
Qty: 2 INHALER | Refills: 3 | Status: SHIPPED | OUTPATIENT
Start: 2019-04-16 | End: 2019-08-08 | Stop reason: SDUPTHER

## 2019-04-16 RX ORDER — CETIRIZINE HYDROCHLORIDE 5 MG/1
5 TABLET ORAL
Qty: 90 TAB | Refills: 1 | Status: SHIPPED | OUTPATIENT
Start: 2019-04-16 | End: 2019-08-08 | Stop reason: SDUPTHER

## 2019-04-16 NOTE — TELEPHONE ENCOUNTER
Pts son came in requesting a refill of all the pts medication besides the topical cream. [place] would [place] [facility]. Pt is completely out of all of these medications.

## 2019-05-15 ENCOUNTER — TELEPHONE (OUTPATIENT)
Dept: FAMILY MEDICINE CLINIC | Age: 64
End: 2019-05-15

## 2019-05-15 NOTE — TELEPHONE ENCOUNTER
Pt's son is calling to request paperwork for him to update his  ID so that he may p/u prescriptions for the pt. When I tried to clarify exactly what he was asking for, the pt's son stated \"  knows what I'm talking about. \" Please advise.

## 2019-05-16 NOTE — TELEPHONE ENCOUNTER
I think he's referring to paperwork allowing the [place] to [place]  [place], right? I don't have that paperwork. Do they get that from the base?

## 2019-05-20 NOTE — LETTER
5/20/2019 5:02 PM 
 
Ms. Damico Rash [address] [place] [id number] To Whom it May Concern; 
     Ms. 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  As a result, she has difficulty walking and cannot shop or  prescriptions on her own and needs an authorized assistant (her son, Andree Church) [place]. 
  
 
Sincerely, 
 
 
Gypsy Prader, MD

## 2019-06-28 ENCOUNTER — PATIENT OUTREACH (OUTPATIENT)
Dept: INTERNAL MEDICINE CLINIC | Age: 64
End: 2019-06-28

## 2019-08-08 ENCOUNTER — OFFICE VISIT (OUTPATIENT)
Dept: INTERNAL MEDICINE CLINIC | Age: 64
End: 2019-08-08

## 2019-08-08 ENCOUNTER — HOSPITAL ENCOUNTER (OUTPATIENT)
Dept: LAB | Age: 64
Discharge: HOME | End: 2019-08-08
Payer: OTHER GOVERNMENT

## 2019-08-08 VITALS
HEART RATE: 68 BPM | OXYGEN SATURATION: 99 % | BODY MASS INDEX: 23.73 KG/M2 | WEIGHT: 110 LBS | RESPIRATION RATE: 18 BRPM | HEIGHT: 57 IN | TEMPERATURE: 98.5 F | DIASTOLIC BLOOD PRESSURE: 78 MMHG | SYSTOLIC BLOOD PRESSURE: 130 MMHG

## 2019-08-08 DIAGNOSIS — B35.3 RECURRENT TINEA PEDIS: ICD-10-CM

## 2019-08-08 DIAGNOSIS — I10 ESSENTIAL HYPERTENSION: ICD-10-CM

## 2019-08-08 DIAGNOSIS — H81.11 BPPV (BENIGN PAROXYSMAL POSITIONAL VERTIGO), RIGHT: ICD-10-CM

## 2019-08-08 DIAGNOSIS — R73.09 ELEVATED GLUCOSE: Primary | ICD-10-CM

## 2019-08-08 DIAGNOSIS — E78.00 HYPERCHOLESTEREMIA: ICD-10-CM

## 2019-08-08 DIAGNOSIS — K21.9 GASTROESOPHAGEAL REFLUX DISEASE, ESOPHAGITIS PRESENCE NOT SPECIFIED: ICD-10-CM

## 2019-08-08 DIAGNOSIS — M19.90 ARTHRITIS: ICD-10-CM

## 2019-08-08 DIAGNOSIS — R73.09 ELEVATED GLUCOSE: ICD-10-CM

## 2019-08-08 DIAGNOSIS — J40 BRONCHITIS: ICD-10-CM

## 2019-08-08 LAB
ALBUMIN SERPL-MCNC: 4.2 G/DL (ref 3.4–5)
ALBUMIN/GLOB SERPL: 1.3 {RATIO} (ref 0.8–1.7)
ALP SERPL-CCNC: 108 U/L (ref 45–117)
ALT SERPL-CCNC: 18 U/L (ref 13–56)
ANION GAP SERPL CALC-SCNC: 5 MMOL/L (ref 3–18)
AST SERPL-CCNC: 17 U/L (ref 10–38)
BILIRUB SERPL-MCNC: 0.4 MG/DL (ref 0.2–1)
BUN SERPL-MCNC: 12 MG/DL (ref 7–18)
BUN/CREAT SERPL: 9 (ref 12–20)
CALCIUM SERPL-MCNC: 8.8 MG/DL (ref 8.5–10.1)
CHLORIDE SERPL-SCNC: 109 MMOL/L (ref 100–111)
CHOLEST SERPL-MCNC: 205 MG/DL
CO2 SERPL-SCNC: 27 MMOL/L (ref 21–32)
CREAT SERPL-MCNC: 1.28 MG/DL (ref 0.6–1.3)
EST. AVERAGE GLUCOSE BLD GHB EST-MCNC: 117 MG/DL
GLOBULIN SER CALC-MCNC: 3.2 G/DL (ref 2–4)
GLUCOSE SERPL-MCNC: 108 MG/DL (ref 74–99)
HBA1C MFR BLD: 5.7 % (ref 4.2–5.6)
HDLC SERPL-MCNC: 78 MG/DL (ref 40–60)
HDLC SERPL: 2.6 {RATIO} (ref 0–5)
LDLC SERPL CALC-MCNC: 110.8 MG/DL (ref 0–100)
LIPID PROFILE,FLP: ABNORMAL
POTASSIUM SERPL-SCNC: 4.9 MMOL/L (ref 3.5–5.5)
PROT SERPL-MCNC: 7.4 G/DL (ref 6.4–8.2)
SODIUM SERPL-SCNC: 141 MMOL/L (ref 136–145)
TRIGL SERPL-MCNC: 81 MG/DL (ref ?–150)
VLDLC SERPL CALC-MCNC: 16.2 MG/DL

## 2019-08-08 PROCEDURE — 36415 COLL VENOUS BLD VENIPUNCTURE: CPT

## 2019-08-08 PROCEDURE — 83036 HEMOGLOBIN GLYCOSYLATED A1C: CPT

## 2019-08-08 PROCEDURE — 80061 LIPID PANEL: CPT

## 2019-08-08 PROCEDURE — 80053 COMPREHEN METABOLIC PANEL: CPT

## 2019-08-08 RX ORDER — LOSARTAN POTASSIUM 50 MG/1
50 TABLET ORAL DAILY
Qty: 90 TAB | Refills: 1 | Status: SHIPPED | OUTPATIENT
Start: 2019-08-08 | End: 2020-04-24 | Stop reason: SDUPTHER

## 2019-08-08 RX ORDER — PHENOL/SODIUM PHENOLATE
20 AEROSOL, SPRAY (ML) MUCOUS MEMBRANE DAILY
Qty: 90 TAB | Refills: 1 | Status: SHIPPED | OUTPATIENT
Start: 2019-08-08 | End: 2020-04-24 | Stop reason: SDUPTHER

## 2019-08-08 RX ORDER — SIMVASTATIN 20 MG/1
TABLET, FILM COATED ORAL
Qty: 90 TAB | Refills: 1 | Status: SHIPPED | OUTPATIENT
Start: 2019-08-08 | End: 2020-04-24 | Stop reason: SDUPTHER

## 2019-08-08 RX ORDER — MECLIZINE HYDROCHLORIDE 25 MG/1
25 TABLET ORAL
Qty: 270 TAB | Refills: 1 | Status: SHIPPED | OUTPATIENT
Start: 2019-08-08 | End: 2020-04-24 | Stop reason: SDUPTHER

## 2019-08-08 RX ORDER — ALBUTEROL SULFATE 90 UG/1
2 AEROSOL, METERED RESPIRATORY (INHALATION)
Qty: 2 INHALER | Refills: 3 | Status: SHIPPED | OUTPATIENT
Start: 2019-08-08 | End: 2020-04-24 | Stop reason: SDUPTHER

## 2019-08-08 RX ORDER — IBUPROFEN 800 MG/1
800 TABLET ORAL
Qty: 90 TAB | Refills: 1 | Status: SHIPPED | OUTPATIENT
Start: 2019-08-08 | End: 2020-04-24 | Stop reason: SDUPTHER

## 2019-08-08 RX ORDER — KETOCONAZOLE 200 MG/1
200 TABLET ORAL DAILY
Qty: 30 TAB | Refills: 0 | Status: SHIPPED | OUTPATIENT
Start: 2019-08-08 | End: 2020-04-24 | Stop reason: ALTCHOICE

## 2019-08-08 RX ORDER — CETIRIZINE HYDROCHLORIDE 5 MG/1
5 TABLET ORAL
Qty: 90 TAB | Refills: 1 | Status: SHIPPED | OUTPATIENT
Start: 2019-08-08 | End: 2020-04-24 | Stop reason: SDUPTHER

## 2019-08-08 NOTE — PROGRESS NOTES
HISTORY OF PRESENT ILLNESS  Bel Farris is a 59 y.o. female. HPI Ms. Rex Tavares is here to follow up on HTN and hypercholesterolemia. She still has on-going issues with her foot fungus. She said she discussed with Dr. German Bedoya about taking an oral medication if the lotrisone doesn't work. She actually thinks the lotrisone may have worsened her sx. She has h/o borderline diabetes. Current Outpatient Medications   Medication Sig    losartan (COZAAR) 50 mg tablet Take 1 Tab by mouth daily.  albuterol (PROVENTIL HFA, VENTOLIN HFA, PROAIR HFA) 90 mcg/actuation inhaler Take 2 Puffs by inhalation every six (6) hours as needed for Wheezing.  cetirizine (ZYRTEC) 5 mg tablet Take 1 Tab by mouth daily as needed for Allergies. Indications: inflammation of the nose due to an allergy    ibuprofen (MOTRIN) 800 mg tablet Take 1 Tab by mouth every eight (8) hours as needed for Pain.  meclizine (ANTIVERT) 25 mg tablet Take 1 Tab by mouth three (3) times daily as needed for Dizziness. Indications: sensation of spinning or whirling    Omeprazole delayed release (PRILOSEC D/R) 20 mg tablet Take 1 Tab by mouth daily.  simvastatin (ZOCOR) 20 mg tablet TAKE 1 TABLET BY MOUTH NIGHTLY AT BEDTIME  Indications: high cholesterol    clotrimazole-betamethasone (LOTRISONE) topical cream Apply small amount to feet bid    inhalational spacing device 1 Each by Does Not Apply route as needed. No current facility-administered medications for this visit. Review of Systems   Cardiovascular: Negative for chest pain and leg swelling. Gastrointestinal: Positive for heartburn (cautioned against chronic NSAID use). Skin: Positive for itching and rash. Scaling, irritation of feet bilat, sides and bottom   Neurological: Positive for dizziness (recurrent vertigo). Psychiatric/Behavioral: Negative for depression. The patient is not nervous/anxious.         Physical Exam   Constitutional: She is oriented to person, place, and time. She appears well-developed and well-nourished. No distress. HENT:   Head: Normocephalic and atraumatic. Eyes: Conjunctivae are normal.   Cardiovascular: Normal rate and regular rhythm. No murmur heard. Pulmonary/Chest: Effort normal and breath sounds normal.   Musculoskeletal: She exhibits no edema. Neurological: She is alert and oriented to person, place, and time. Skin: Rash (bottom of feet - scaly, erythematous) noted. Psychiatric: She has a normal mood and affect. Her behavior is normal. Judgment and thought content normal.     Visit Vitals  /78 (BP 1 Location: Left arm, BP Patient Position: Sitting)   Pulse 68   Temp 98.5 °F (36.9 °C) (Oral)   Resp 18   Ht 4' 9\" (1.448 m)   Wt 110 lb (49.9 kg)   SpO2 99%   BMI 23.80 kg/m²     Wt Readings from Last 3 Encounters:   08/08/19 110 lb (49.9 kg)   12/27/18 110 lb (49.9 kg)   05/22/18 111 lb (50.3 kg)       ASSESSMENT and PLAN    ICD-10-CM ICD-9-CM    1. Elevated glucose R73.09 790.29 HEMOGLOBIN A1C WITH EAG   2. Essential hypertension I10 401.9 losartan (COZAAR) 50 mg tablet      METABOLIC PANEL, COMPREHENSIVE      LIPID PANEL   3. Bronchitis J40 490 albuterol (PROVENTIL HFA, VENTOLIN HFA, PROAIR HFA) 90 mcg/actuation inhaler   4. Gastroesophageal reflux disease, esophagitis presence not specified K21.9 530.81 cetirizine (ZYRTEC) 5 mg tablet      Omeprazole delayed release (PRILOSEC D/R) 20 mg tablet   5. Arthritis M19.90 716.90 ibuprofen (MOTRIN) 800 mg tablet   6. BPPV (benign paroxysmal positional vertigo), right H81.11 386.11 meclizine (ANTIVERT) 25 mg tablet   7. Hypercholesteremia E78.00 272.0 simvastatin (ZOCOR) 20 mg tablet     Pt verbalized understanding of their condition and diagnoses, treatment plan,  as well as side effects of any new medications prescribed.

## 2020-04-24 ENCOUNTER — VIRTUAL VISIT (OUTPATIENT)
Dept: FAMILY MEDICINE CLINIC | Age: 65
End: 2020-04-24

## 2020-04-24 DIAGNOSIS — M54.42 CHRONIC BILATERAL LOW BACK PAIN WITH LEFT-SIDED SCIATICA: ICD-10-CM

## 2020-04-24 DIAGNOSIS — K21.9 GASTROESOPHAGEAL REFLUX DISEASE, ESOPHAGITIS PRESENCE NOT SPECIFIED: ICD-10-CM

## 2020-04-24 DIAGNOSIS — G89.29 CHRONIC BILATERAL LOW BACK PAIN WITH LEFT-SIDED SCIATICA: ICD-10-CM

## 2020-04-24 DIAGNOSIS — J30.9 ALLERGIC RHINITIS, UNSPECIFIED SEASONALITY, UNSPECIFIED TRIGGER: Primary | ICD-10-CM

## 2020-04-24 DIAGNOSIS — E78.00 HYPERCHOLESTEREMIA: ICD-10-CM

## 2020-04-24 DIAGNOSIS — I10 ESSENTIAL HYPERTENSION: ICD-10-CM

## 2020-04-24 DIAGNOSIS — H81.11 BPPV (BENIGN PAROXYSMAL POSITIONAL VERTIGO), RIGHT: ICD-10-CM

## 2020-04-24 DIAGNOSIS — J98.01 BRONCHOSPASM: ICD-10-CM

## 2020-04-24 RX ORDER — ACETAMINOPHEN 500 MG
TABLET ORAL 2 TIMES DAILY
COMMUNITY
End: 2021-05-26 | Stop reason: SDUPTHER

## 2020-04-24 RX ORDER — GARLIC 1000 MG
CAPSULE ORAL
COMMUNITY
End: 2021-05-26 | Stop reason: SDUPTHER

## 2020-04-24 RX ORDER — LOSARTAN POTASSIUM 50 MG/1
50 TABLET ORAL DAILY
Qty: 90 TAB | Refills: 1 | Status: SHIPPED | OUTPATIENT
Start: 2020-04-24 | End: 2020-09-29 | Stop reason: SDUPTHER

## 2020-04-24 RX ORDER — ALBUTEROL SULFATE 90 UG/1
2 AEROSOL, METERED RESPIRATORY (INHALATION)
Qty: 2 INHALER | Refills: 1 | Status: SHIPPED | OUTPATIENT
Start: 2020-04-24 | End: 2020-09-29 | Stop reason: SDUPTHER

## 2020-04-24 RX ORDER — CETIRIZINE HYDROCHLORIDE 5 MG/1
5 TABLET ORAL
Qty: 90 TAB | Refills: 1 | Status: SHIPPED | OUTPATIENT
Start: 2020-04-24 | End: 2020-09-29 | Stop reason: SDUPTHER

## 2020-04-24 RX ORDER — IBUPROFEN 800 MG/1
800 TABLET ORAL
Qty: 90 TAB | Refills: 1 | Status: SHIPPED | OUTPATIENT
Start: 2020-04-24 | End: 2020-09-29 | Stop reason: SDUPTHER

## 2020-04-24 RX ORDER — SIMVASTATIN 20 MG/1
TABLET, FILM COATED ORAL
Qty: 90 TAB | Refills: 1 | Status: SHIPPED | OUTPATIENT
Start: 2020-04-24 | End: 2020-09-29 | Stop reason: SDUPTHER

## 2020-04-24 RX ORDER — MECLIZINE HYDROCHLORIDE 25 MG/1
25 TABLET ORAL
Qty: 180 TAB | Refills: 1 | Status: SHIPPED | OUTPATIENT
Start: 2020-04-24 | End: 2020-09-29 | Stop reason: SDUPTHER

## 2020-04-24 RX ORDER — PHENOL/SODIUM PHENOLATE
20 AEROSOL, SPRAY (ML) MUCOUS MEMBRANE DAILY
Qty: 90 TAB | Refills: 1 | Status: SHIPPED | OUTPATIENT
Start: 2020-04-24 | End: 2020-09-29 | Stop reason: SDUPTHER

## 2020-04-24 NOTE — PROGRESS NOTES
Moncho Ramirez is a 72 y.o. female who was seen by synchronous (real-time) audio-video technology on 4/24/2020. Consent: Moncho Ramirez, who was seen by synchronous (real-time) audio-video technology, and/or her healthcare decision maker, is aware that this patient-initiated, Telehealth encounter on 4/24/2020 is a billable service, with coverage as determined by her insurance carrier. She is aware that she may receive a bill and has provided verbal consent to proceed: Yes. Assessment & Plan:   Diagnoses and all orders for this visit:    1. Allergic rhinitis, unspecified seasonality, unspecified trigger  -     cetirizine (ZYRTEC) 5 mg tablet; Take 1 Tab by mouth daily as needed for Allergies. Indications: inflammation of the nose due to an allergy    2. Essential hypertension  -     losartan (COZAAR) 50 mg tablet; Take 1 Tab by mouth daily. 3. BPPV (benign paroxysmal positional vertigo), right  -     meclizine (ANTIVERT) 25 mg tablet; Take 1 Tab by mouth three (3) times daily as needed for Dizziness. Indications: sensation of spinning or whirling    4. Hypercholesteremia  -     simvastatin (ZOCOR) 20 mg tablet; TAKE 1 TABLET BY MOUTH NIGHTLY AT BEDTIME  Indications: high cholesterol    5. Gastroesophageal reflux disease, esophagitis presence not specified  -     Omeprazole delayed release (PRILOSEC D/R) 20 mg tablet; Take 1 Tab by mouth daily. 6. Chronic bilateral low back pain with left-sided sciatica  -     ibuprofen (MOTRIN) 800 mg tablet; Take 1 Tab by mouth every eight (8) hours as needed for Pain. 7. Bronchospasm  -     albuterol (PROVENTIL HFA, VENTOLIN HFA, PROAIR HFA) 90 mcg/actuation inhaler; Take 2 Puffs by inhalation every six (6) hours as needed for Wheezing. Will plan to see her in the office for a visit in June. 712  Subjective:   Moncho Ramirez is a 72 y.o. female who was seen for No chief complaint on file. Pt was seen on a virtual visit today.   Daughter in  April spoke with pt on the visit. Pt has several medical problems that are chronic in nature. Hx of LT eye blindness since 2010. Was the result after unsuccessful glaucoma and cataract surgery. Has a long chronic hx with vertigo, was managed by Dr. Robyn Mortimer never seen by ENT or Neuro. Takes meclizine almost daily. Family has noticed that her memory is cjanging and she is forgetful but nothing critical at present. Hx of allergies, taking Zyrtec. Needs refill. Chr. Low back pain: managed until now with Motrin but this is not working well anymore. Has never had an Xrays or MRI imaging. Associated with pain down the LT leg and into the foot with some numbness intermittently in the foot. Prior to Admission medications    Medication Sig Start Date End Date Taking? Authorizing Provider   losartan (COZAAR) 50 mg tablet Take 1 Tab by mouth daily. 4/24/20  Yes Lyndsey Quintana MD   meclizine (ANTIVERT) 25 mg tablet Take 1 Tab by mouth three (3) times daily as needed for Dizziness. Indications: sensation of spinning or whirling 4/24/20  Yes Lyndsey Quintana MD   simvastatin (ZOCOR) 20 mg tablet TAKE 1 TABLET BY MOUTH NIGHTLY AT BEDTIME  Indications: high cholesterol 4/24/20  Yes Lyndsey Quintana MD   Omeprazole delayed release (PRILOSEC D/R) 20 mg tablet Take 1 Tab by mouth daily. 4/24/20  Yes Lyndsey Quintana MD   albuterol (PROVENTIL HFA, VENTOLIN HFA, PROAIR HFA) 90 mcg/actuation inhaler Take 2 Puffs by inhalation every six (6) hours as needed for Wheezing. 4/24/20  Yes Lyndsey Quintana MD   ibuprofen (MOTRIN) 800 mg tablet Take 1 Tab by mouth every eight (8) hours as needed for Pain. 4/24/20  Yes Lyndsey Quintana MD   cetirizine (ZYRTEC) 5 mg tablet Take 1 Tab by mouth daily as needed for Allergies. Indications: inflammation of the nose due to an allergy 4/24/20  Yes Lyndsey Quintana MD   multivit/folic acid/vit K1 (ONE-A-DAY WOMEN'S 50 PLUS PO) Take  by mouth daily.    Yes Provider, Historical cholecalciferol (VITAMIN D3) (2,000 UNITS /50 MCG) cap capsule Take  by mouth two (2) times a day. Yes Provider, Historical   garlic 7,722 mg cap Take  by mouth. Yes Provider, Historical   losartan (COZAAR) 50 mg tablet Take 1 Tab by mouth daily. 8/8/19 4/24/20  Jayro Carrion PA-C   albuterol (PROVENTIL HFA, VENTOLIN HFA, PROAIR HFA) 90 mcg/actuation inhaler Take 2 Puffs by inhalation every six (6) hours as needed for Wheezing. 8/8/19 4/24/20  Kris Larose PA-C   cetirizine (ZYRTEC) 5 mg tablet Take 1 Tab by mouth daily as needed for Allergies. Indications: inflammation of the nose due to an allergy 8/8/19 4/24/20  Kris Larose PA-C   ibuprofen (MOTRIN) 800 mg tablet Take 1 Tab by mouth every eight (8) hours as needed for Pain. 8/8/19 4/24/20  Kris Larose PA-C   meclizine (ANTIVERT) 25 mg tablet Take 1 Tab by mouth three (3) times daily as needed for Dizziness. Indications: sensation of spinning or whirling 8/8/19 4/24/20  Jayro Carrion PA-C   Omeprazole delayed release (PRILOSEC D/R) 20 mg tablet Take 1 Tab by mouth daily. 8/8/19 4/24/20  Kris Larose PA-C   simvastatin (ZOCOR) 20 mg tablet TAKE 1 TABLET BY MOUTH NIGHTLY AT BEDTIME  Indications: high cholesterol 8/8/19 4/24/20  Jayro Carrion PA-C   ketoconazole (NIZORAL) 200 mg tablet Take 1 Tab by mouth daily. 8/8/19 4/24/20  Kris Larose PA-C   clotrimazole-betamethasone (LOTRISONE) topical cream Apply small amount to feet bid 12/27/18 4/24/20  Kris Larose PA-C   inhalational spacing device 1 Each by Does Not Apply route as needed.  12/29/16 4/24/20  Hermila Menon MD     Allergies   Allergen Reactions    Flexeril [Cyclobenzaprine] Swelling     Leg swelling       Patient Active Problem List   Diagnosis Code    Essential hypertension I10    Hypercholesteremia E78.00     Current Outpatient Medications   Medication Sig Dispense Refill    losartan (COZAAR) 50 mg tablet Take 1 Tab by mouth daily. 90 Tab 1    meclizine (ANTIVERT) 25 mg tablet Take 1 Tab by mouth three (3) times daily as needed for Dizziness. Indications: sensation of spinning or whirling 180 Tab 1    simvastatin (ZOCOR) 20 mg tablet TAKE 1 TABLET BY MOUTH NIGHTLY AT BEDTIME  Indications: high cholesterol 90 Tab 1    Omeprazole delayed release (PRILOSEC D/R) 20 mg tablet Take 1 Tab by mouth daily. 90 Tab 1    albuterol (PROVENTIL HFA, VENTOLIN HFA, PROAIR HFA) 90 mcg/actuation inhaler Take 2 Puffs by inhalation every six (6) hours as needed for Wheezing. 2 Inhaler 1    ibuprofen (MOTRIN) 800 mg tablet Take 1 Tab by mouth every eight (8) hours as needed for Pain. 90 Tab 1    cetirizine (ZYRTEC) 5 mg tablet Take 1 Tab by mouth daily as needed for Allergies. Indications: inflammation of the nose due to an allergy 90 Tab 1    multivit/folic acid/vit K1 (ONE-A-DAY WOMEN'S 50 PLUS PO) Take  by mouth daily.  cholecalciferol (VITAMIN D3) (2,000 UNITS /50 MCG) cap capsule Take  by mouth two (2) times a day.  garlic 4,032 mg cap Take  by mouth. Allergies   Allergen Reactions    Flexeril [Cyclobenzaprine] Swelling     Leg swelling     No past medical history on file. Past Surgical History:   Procedure Laterality Date    HX TUBAL LIGATION         Review of Systems   Musculoskeletal: Positive for back pain. All other systems reviewed and are negative. Objective: There were no vitals taken for this visit. General: alert, cooperative, no distress   Mental  status: normal mood, behavior, speech, dress, motor activity, and thought processes, able to follow commands   HENT: NCAT   Neck: no visualized mass   Resp: no respiratory distress   Neuro: no gross deficits   Skin: no discoloration or lesions of concern on visible areas   Psychiatric: normal affect, consistent with stated mood, no evidence of hallucinations     Additional exam findings:        We discussed the expected course, resolution and complications of the diagnosis(es) in detail. Medication risks, benefits, costs, interactions, and alternatives were discussed as indicated. I advised her to contact the office if her condition worsens, changes or fails to improve as anticipated. She expressed understanding with the diagnosis(es) and plan. Mary Harrington is a 72 y.o. female who was evaluated by a video visit encounter for concerns as above. Patient identification was verified prior to start of the visit. A caregiver was present when appropriate. Due to this being a TeleHealth encounter (During [facility]-29 public health emergency), evaluation of the following organ systems was limited: Vitals/Constitutional/EENT/Resp/CV/GI//MS/Neuro/Skin/Heme-Lymph-Imm. Pursuant to the emergency declaration under the [facility], 1135 waiver authority and the [employer] Act, this Virtual  Visit was conducted, with patient's (and/or legal guardian's) consent, to reduce the patient's risk of exposure to COVID-19 and provide necessary medical care. Services were provided through a video synchronous discussion virtually to substitute for in-person clinic visit. Patient and provider were located at their individual homes.       Jerzy Conrad MD

## 2020-09-29 DIAGNOSIS — K21.9 GASTROESOPHAGEAL REFLUX DISEASE WITHOUT ESOPHAGITIS: ICD-10-CM

## 2020-09-29 DIAGNOSIS — J30.9 ALLERGIC RHINITIS, UNSPECIFIED SEASONALITY, UNSPECIFIED TRIGGER: ICD-10-CM

## 2020-09-29 DIAGNOSIS — M54.42 CHRONIC BILATERAL LOW BACK PAIN WITH LEFT-SIDED SCIATICA: ICD-10-CM

## 2020-09-29 DIAGNOSIS — E78.00 HYPERCHOLESTEREMIA: ICD-10-CM

## 2020-09-29 DIAGNOSIS — I10 ESSENTIAL HYPERTENSION: ICD-10-CM

## 2020-09-29 DIAGNOSIS — H81.11 BPPV (BENIGN PAROXYSMAL POSITIONAL VERTIGO), RIGHT: ICD-10-CM

## 2020-09-29 DIAGNOSIS — J98.01 BRONCHOSPASM: ICD-10-CM

## 2020-09-29 DIAGNOSIS — G89.29 CHRONIC BILATERAL LOW BACK PAIN WITH LEFT-SIDED SCIATICA: ICD-10-CM

## 2020-09-29 NOTE — TELEPHONE ENCOUNTER
Requested Prescriptions     Pending Prescriptions Disp Refills    albuterol (PROVENTIL HFA, VENTOLIN HFA, PROAIR HFA) 90 mcg/actuation inhaler 2 Inhaler 1     Sig: Take 2 Puffs by inhalation every six (6) hours as needed for Wheezing.  cetirizine (ZYRTEC) 5 mg tablet 90 Tab 1     Sig: Take 1 Tab by mouth daily as needed for Allergies. Indications: inflammation of the nose due to an allergy    ibuprofen (MOTRIN) 800 mg tablet 90 Tab 1     Sig: Take 1 Tab by mouth every eight (8) hours as needed for Pain.  losartan (COZAAR) 50 mg tablet 90 Tab 1     Sig: Take 1 Tab by mouth daily.  meclizine (ANTIVERT) 25 mg tablet 180 Tab 1     Sig: Take 1 Tab by mouth three (3) times daily as needed for Dizziness. Indications: sensation of spinning or whirling    Omeprazole delayed release (PRILOSEC D/R) 20 mg tablet 90 Tab 1     Sig: Take 1 Tab by mouth daily.     simvastatin (ZOCOR) 20 mg tablet 90 Tab 1     Sig: TAKE 1 TABLET BY MOUTH NIGHTLY AT BEDTIME  Indications: high cholesterol

## 2020-09-30 NOTE — TELEPHONE ENCOUNTER
Last seen [facility] w/ Dr. Meet Garza 4/24/20    Last filled 4/24/20 qty 90 day w/ 1 refill    No future appointments.

## 2020-10-01 RX ORDER — CETIRIZINE HYDROCHLORIDE 5 MG/1
5 TABLET ORAL
Qty: 90 TAB | Refills: 0 | Status: SHIPPED | OUTPATIENT
Start: 2020-10-01 | End: 2020-12-10 | Stop reason: SDUPTHER

## 2020-10-01 RX ORDER — PHENOL/SODIUM PHENOLATE
20 AEROSOL, SPRAY (ML) MUCOUS MEMBRANE DAILY
Qty: 90 TAB | Refills: 0 | Status: SHIPPED | OUTPATIENT
Start: 2020-10-01 | End: 2020-12-10 | Stop reason: SDUPTHER

## 2020-10-01 RX ORDER — ALBUTEROL SULFATE 90 UG/1
2 AEROSOL, METERED RESPIRATORY (INHALATION)
Qty: 1 INHALER | Refills: 0 | Status: SHIPPED | OUTPATIENT
Start: 2020-10-01 | End: 2020-12-10 | Stop reason: SDUPTHER

## 2020-10-01 RX ORDER — IBUPROFEN 800 MG/1
800 TABLET ORAL
Qty: 90 TAB | Refills: 0 | Status: SHIPPED | OUTPATIENT
Start: 2020-10-01 | End: 2020-12-10 | Stop reason: SDUPTHER

## 2020-10-01 RX ORDER — LOSARTAN POTASSIUM 50 MG/1
50 TABLET ORAL DAILY
Qty: 90 TAB | Refills: 0 | Status: SHIPPED | OUTPATIENT
Start: 2020-10-01 | End: 2020-12-10 | Stop reason: SDUPTHER

## 2020-10-01 RX ORDER — SIMVASTATIN 20 MG/1
TABLET, FILM COATED ORAL
Qty: 90 TAB | Refills: 0 | Status: SHIPPED | OUTPATIENT
Start: 2020-10-01 | End: 2020-12-10 | Stop reason: SDUPTHER

## 2020-10-01 RX ORDER — MECLIZINE HYDROCHLORIDE 25 MG/1
25 TABLET ORAL
Qty: 180 TAB | Refills: 0 | Status: SHIPPED | OUTPATIENT
Start: 2020-10-01 | End: 2020-12-10 | Stop reason: SDUPTHER

## 2020-10-01 NOTE — TELEPHONE ENCOUNTER
Please call patient to schedule an appt for a physical with BW prior anytime now. She was supposed to see me a few months ago.

## 2020-12-10 ENCOUNTER — TELEPHONE (OUTPATIENT)
Dept: FAMILY MEDICINE CLINIC | Age: 65
End: 2020-12-10

## 2020-12-10 DIAGNOSIS — J30.9 ALLERGIC RHINITIS, UNSPECIFIED SEASONALITY, UNSPECIFIED TRIGGER: ICD-10-CM

## 2020-12-10 DIAGNOSIS — K21.9 GASTROESOPHAGEAL REFLUX DISEASE WITHOUT ESOPHAGITIS: ICD-10-CM

## 2020-12-10 DIAGNOSIS — I10 ESSENTIAL HYPERTENSION: ICD-10-CM

## 2020-12-10 DIAGNOSIS — J98.01 BRONCHOSPASM: ICD-10-CM

## 2020-12-10 DIAGNOSIS — H81.11 BPPV (BENIGN PAROXYSMAL POSITIONAL VERTIGO), RIGHT: ICD-10-CM

## 2020-12-10 DIAGNOSIS — G89.29 CHRONIC BILATERAL LOW BACK PAIN WITH LEFT-SIDED SCIATICA: ICD-10-CM

## 2020-12-10 DIAGNOSIS — E78.00 HYPERCHOLESTEREMIA: ICD-10-CM

## 2020-12-10 DIAGNOSIS — M54.42 CHRONIC BILATERAL LOW BACK PAIN WITH LEFT-SIDED SCIATICA: ICD-10-CM

## 2020-12-10 NOTE — TELEPHONE ENCOUNTER
Requested Prescriptions     Pending Prescriptions Disp Refills    albuterol (PROVENTIL HFA, VENTOLIN HFA, PROAIR HFA) 90 mcg/actuation inhaler 1 Inhaler 0     Sig: Take 2 Puffs by inhalation every six (6) hours as needed for Wheezing.  cetirizine (ZYRTEC) 5 mg tablet 90 Tab 0     Sig: Take 1 Tab by mouth daily as needed for Allergies. Indications: inflammation of the nose due to an allergy    ibuprofen (MOTRIN) 800 mg tablet 90 Tab 0     Sig: Take 1 Tab by mouth every eight (8) hours as needed for Pain.  losartan (COZAAR) 50 mg tablet 90 Tab 0     Sig: Take 1 Tab by mouth daily.  meclizine (ANTIVERT) 25 mg tablet 180 Tab 0     Sig: Take 1 Tab by mouth two (2) times daily as needed for Dizziness. Indications: sensation of spinning or whirling    Omeprazole delayed release (PRILOSEC D/R) 20 mg tablet 90 Tab 0     Sig: Take 1 Tab by mouth daily.     simvastatin (ZOCOR) 20 mg tablet 90 Tab 0     Sig: TAKE 1 TABLET BY MOUTH NIGHTLY AT BEDTIME  Indications: high cholesterol

## 2020-12-15 NOTE — TELEPHONE ENCOUNTER
Last refilled 10/1/20 for 90 days with 0 refills . Inhaler 1 with 0 refills . Last OV 4/24/20 No future appointments.

## 2021-01-05 NOTE — TELEPHONE ENCOUNTER
This patient transferred care to dr. Rusty Mcdowell on 04/24/2020. Dr. Anshul Maldonado no longer works for [employer]. Please advise    Requested Prescriptions     Pending Prescriptions Disp Refills    albuterol (PROVENTIL HFA, VENTOLIN HFA, PROAIR HFA) 90 mcg/actuation inhaler 1 Inhaler 0     Sig: Take 2 Puffs by inhalation every six (6) hours as needed for Wheezing.  cetirizine (ZYRTEC) 5 mg tablet 90 Tab 0     Sig: Take 1 Tab by mouth daily as needed for Allergies. Indications: inflammation of the nose due to an allergy    ibuprofen (MOTRIN) 800 mg tablet 90 Tab 0     Sig: Take 1 Tab by mouth every eight (8) hours as needed for Pain.  losartan (COZAAR) 50 mg tablet 90 Tab 0     Sig: Take 1 Tab by mouth daily.  meclizine (ANTIVERT) 25 mg tablet 180 Tab 0     Sig: Take 1 Tab by mouth two (2) times daily as needed for Dizziness. Indications: sensation of spinning or whirling    Omeprazole delayed release (PRILOSEC D/R) 20 mg tablet 90 Tab 0     Sig: Take 1 Tab by mouth daily.     simvastatin (ZOCOR) 20 mg tablet 90 Tab 0     Sig: TAKE 1 TABLET BY MOUTH NIGHTLY AT BEDTIME  Indications: high cholesterol

## 2021-01-06 RX ORDER — LOSARTAN POTASSIUM 50 MG/1
50 TABLET ORAL DAILY
Qty: 90 TAB | Refills: 0 | Status: SHIPPED | OUTPATIENT
Start: 2021-01-06 | End: 2021-05-26 | Stop reason: SDUPTHER

## 2021-01-06 RX ORDER — PHENOL/SODIUM PHENOLATE
20 AEROSOL, SPRAY (ML) MUCOUS MEMBRANE DAILY
Qty: 90 TAB | Refills: 0 | Status: SHIPPED | OUTPATIENT
Start: 2021-01-06 | End: 2021-05-26 | Stop reason: SDUPTHER

## 2021-01-06 RX ORDER — MECLIZINE HYDROCHLORIDE 25 MG/1
25 TABLET ORAL
Qty: 180 TAB | Refills: 0 | Status: SHIPPED | OUTPATIENT
Start: 2021-01-06 | End: 2021-05-26 | Stop reason: SDUPTHER

## 2021-01-06 RX ORDER — IBUPROFEN 800 MG/1
800 TABLET ORAL
Qty: 90 TAB | Refills: 0 | Status: SHIPPED | OUTPATIENT
Start: 2021-01-06 | End: 2021-06-04

## 2021-01-06 RX ORDER — ALBUTEROL SULFATE 90 UG/1
2 AEROSOL, METERED RESPIRATORY (INHALATION)
Qty: 3 INHALER | Refills: 0 | Status: SHIPPED | OUTPATIENT
Start: 2021-01-06 | End: 2021-05-26 | Stop reason: SDUPTHER

## 2021-01-06 RX ORDER — CETIRIZINE HYDROCHLORIDE 5 MG/1
5 TABLET ORAL
Qty: 90 TAB | Refills: 0 | Status: SHIPPED | OUTPATIENT
Start: 2021-01-06 | End: 2021-05-26 | Stop reason: SDUPTHER

## 2021-01-06 RX ORDER — SIMVASTATIN 20 MG/1
TABLET, FILM COATED ORAL
Qty: 90 TAB | Refills: 0 | Status: SHIPPED | OUTPATIENT
Start: 2021-01-06 | End: 2021-05-26 | Stop reason: SDUPTHER

## 2021-05-26 DIAGNOSIS — N18.30 STAGE 3 CHRONIC KIDNEY DISEASE, UNSPECIFIED WHETHER STAGE 3A OR 3B CKD (HCC): Primary | ICD-10-CM

## 2021-05-26 DIAGNOSIS — E78.00 HYPERCHOLESTEREMIA: ICD-10-CM

## 2021-05-26 DIAGNOSIS — J30.9 ALLERGIC RHINITIS, UNSPECIFIED SEASONALITY, UNSPECIFIED TRIGGER: ICD-10-CM

## 2021-05-26 DIAGNOSIS — H81.11 BPPV (BENIGN PAROXYSMAL POSITIONAL VERTIGO), RIGHT: ICD-10-CM

## 2021-05-26 DIAGNOSIS — I10 ESSENTIAL HYPERTENSION: ICD-10-CM

## 2021-05-26 DIAGNOSIS — J98.01 BRONCHOSPASM: ICD-10-CM

## 2021-05-26 DIAGNOSIS — M54.42 CHRONIC BILATERAL LOW BACK PAIN WITH LEFT-SIDED SCIATICA: ICD-10-CM

## 2021-05-26 DIAGNOSIS — R73.03 PREDIABETES: ICD-10-CM

## 2021-05-26 DIAGNOSIS — G89.29 CHRONIC BILATERAL LOW BACK PAIN WITH LEFT-SIDED SCIATICA: ICD-10-CM

## 2021-05-26 DIAGNOSIS — K21.9 GASTROESOPHAGEAL REFLUX DISEASE WITHOUT ESOPHAGITIS: ICD-10-CM

## 2021-05-26 RX ORDER — IBUPROFEN 800 MG/1
800 TABLET ORAL
Qty: 90 TABLET | Refills: 0 | Status: CANCELLED | OUTPATIENT
Start: 2021-05-26

## 2021-05-26 NOTE — TELEPHONE ENCOUNTER
Last refill was 01/06/2021  Future Appointments   Date Time Provider Luma Armentai   6/4/2021 10:30 AM Luis Jcaobsen MD [facility]     Last appointment was 04/24/2020 by Dr. Amna Stern.

## 2021-05-26 NOTE — TELEPHONE ENCOUNTER
Pt son called to schedule ENRIQUETA appt and request medications. I asked him to clarify exactly which ones and he stated that she needs them all. Please advise. Future Appointments   Date Time Provider Luma Monsivais   6/4/2021 10:30 AM Melania Ferguson MD [facility]         Requested Prescriptions     Pending Prescriptions Disp Refills    albuterol (PROVENTIL HFA, VENTOLIN HFA, PROAIR HFA) 90 mcg/actuation inhaler 3 Inhaler 0     Sig: Take 2 Puffs by inhalation every six (6) hours as needed for Wheezing.  cetirizine (ZYRTEC) 5 mg tablet 90 Tablet 0     Sig: Take 1 Tablet by mouth daily as needed for Allergies. Indications: inflammation of the nose due to an allergy    cholecalciferol (VITAMIN D3) (2,000 UNITS /50 MCG) cap capsule       Sig: Take  by mouth two (2) times a day.  garlic 1,883 mg cap       Sig: Take  by mouth.  ibuprofen (MOTRIN) 800 mg tablet 90 Tablet 0     Sig: Take 1 Tablet by mouth every eight (8) hours as needed for Pain.  meclizine (ANTIVERT) 25 mg tablet 180 Tablet 0     Sig: Take 1 Tablet by mouth two (2) times daily as needed for Dizziness. Indications: sensation of spinning or whirling    losartan (COZAAR) 50 mg tablet 90 Tablet 0     Sig: Take 1 Tablet by mouth daily.  Omeprazole delayed release (PRILOSEC D/R) 20 mg tablet 90 Tablet 0     Sig: Take 1 Tablet by mouth daily.     simvastatin (ZOCOR) 20 mg tablet 90 Tablet 0     Sig: TAKE 1 TABLET BY MOUTH NIGHTLY AT BEDTIME  Indications: high cholesterol

## 2021-05-27 RX ORDER — SIMVASTATIN 20 MG/1
TABLET, FILM COATED ORAL
Qty: 90 TABLET | Refills: 0 | Status: SHIPPED | OUTPATIENT
Start: 2021-05-27 | End: 2021-06-04 | Stop reason: SDUPTHER

## 2021-05-27 RX ORDER — GARLIC 1000 MG
CAPSULE ORAL
Qty: 90 CAPSULE | Refills: 0 | Status: SHIPPED | OUTPATIENT
Start: 2021-05-27

## 2021-05-27 RX ORDER — CETIRIZINE HYDROCHLORIDE 5 MG/1
5 TABLET ORAL
Qty: 90 TABLET | Refills: 0 | Status: SHIPPED | OUTPATIENT
Start: 2021-05-27 | End: 2021-06-04 | Stop reason: SDUPTHER

## 2021-05-27 RX ORDER — ALBUTEROL SULFATE 90 UG/1
2 AEROSOL, METERED RESPIRATORY (INHALATION)
Qty: 3 INHALER | Refills: 0 | Status: SHIPPED | OUTPATIENT
Start: 2021-05-27 | End: 2021-06-04 | Stop reason: SDUPTHER

## 2021-05-27 RX ORDER — PHENOL/SODIUM PHENOLATE
20 AEROSOL, SPRAY (ML) MUCOUS MEMBRANE DAILY
Qty: 90 TABLET | Refills: 0 | Status: SHIPPED | OUTPATIENT
Start: 2021-05-27 | End: 2021-06-04 | Stop reason: SDUPTHER

## 2021-05-27 RX ORDER — ACETAMINOPHEN 500 MG
2000 TABLET ORAL DAILY
Qty: 90 CAPSULE | Refills: 1 | Status: SHIPPED | OUTPATIENT
Start: 2021-05-27 | End: 2021-09-14

## 2021-05-27 RX ORDER — LOSARTAN POTASSIUM 50 MG/1
50 TABLET ORAL DAILY
Qty: 90 TABLET | Refills: 0 | Status: SHIPPED | OUTPATIENT
Start: 2021-05-27 | End: 2021-06-04 | Stop reason: SDUPTHER

## 2021-05-27 RX ORDER — MECLIZINE HYDROCHLORIDE 25 MG/1
25 TABLET ORAL
Qty: 180 TABLET | Refills: 0 | Status: SHIPPED | OUTPATIENT
Start: 2021-05-27 | End: 2021-06-04 | Stop reason: SDUPTHER

## 2021-06-04 ENCOUNTER — HOSPITAL ENCOUNTER (OUTPATIENT)
Dept: LAB | Age: 66
Discharge: HOME | End: 2021-06-04

## 2021-06-04 ENCOUNTER — OFFICE VISIT (OUTPATIENT)
Dept: FAMILY MEDICINE CLINIC | Age: 66
End: 2021-06-04
Payer: MEDICARE

## 2021-06-04 ENCOUNTER — APPOINTMENT (OUTPATIENT)
Dept: FAMILY MEDICINE CLINIC | Age: 66
End: 2021-06-04

## 2021-06-04 VITALS
TEMPERATURE: 97 F | OXYGEN SATURATION: 100 % | BODY MASS INDEX: 24.16 KG/M2 | HEIGHT: 57 IN | SYSTOLIC BLOOD PRESSURE: 138 MMHG | RESPIRATION RATE: 16 BRPM | HEART RATE: 72 BPM | DIASTOLIC BLOOD PRESSURE: 74 MMHG | WEIGHT: 112 LBS

## 2021-06-04 DIAGNOSIS — I10 ESSENTIAL HYPERTENSION: ICD-10-CM

## 2021-06-04 DIAGNOSIS — R41.3 MEMORY LOSS: ICD-10-CM

## 2021-06-04 DIAGNOSIS — E53.8 VITAMIN B12 DEFICIENCY: ICD-10-CM

## 2021-06-04 DIAGNOSIS — K21.9 GASTROESOPHAGEAL REFLUX DISEASE WITHOUT ESOPHAGITIS: ICD-10-CM

## 2021-06-04 DIAGNOSIS — M54.42 CHRONIC BILATERAL LOW BACK PAIN WITH LEFT-SIDED SCIATICA: ICD-10-CM

## 2021-06-04 DIAGNOSIS — E55.9 VITAMIN D DEFICIENCY: ICD-10-CM

## 2021-06-04 DIAGNOSIS — H81.11 BPPV (BENIGN PAROXYSMAL POSITIONAL VERTIGO), RIGHT: ICD-10-CM

## 2021-06-04 DIAGNOSIS — I10 ESSENTIAL HYPERTENSION: Primary | ICD-10-CM

## 2021-06-04 DIAGNOSIS — J98.01 BRONCHOSPASM: ICD-10-CM

## 2021-06-04 DIAGNOSIS — F17.210 SMOKING GREATER THAN 30 PACK YEARS: ICD-10-CM

## 2021-06-04 DIAGNOSIS — N18.30 STAGE 3 CHRONIC KIDNEY DISEASE, UNSPECIFIED WHETHER STAGE 3A OR 3B CKD (HCC): ICD-10-CM

## 2021-06-04 DIAGNOSIS — R26.9 ABNORMAL GAIT: ICD-10-CM

## 2021-06-04 DIAGNOSIS — G89.29 CHRONIC BILATERAL LOW BACK PAIN WITH LEFT-SIDED SCIATICA: ICD-10-CM

## 2021-06-04 DIAGNOSIS — R73.03 PREDIABETES: ICD-10-CM

## 2021-06-04 DIAGNOSIS — J30.9 ALLERGIC RHINITIS, UNSPECIFIED SEASONALITY, UNSPECIFIED TRIGGER: ICD-10-CM

## 2021-06-04 DIAGNOSIS — E78.00 HYPERCHOLESTEREMIA: ICD-10-CM

## 2021-06-04 LAB
25(OH)D3 SERPL-MCNC: 36.1 NG/ML (ref 30–100)
ALBUMIN SERPL-MCNC: 4.2 G/DL (ref 3.4–5)
ALBUMIN/GLOB SERPL: 1.3 {RATIO} (ref 0.8–1.7)
ALP SERPL-CCNC: 72 U/L (ref 45–117)
ALT SERPL-CCNC: 44 U/L (ref 13–56)
ANION GAP SERPL CALC-SCNC: 7 MMOL/L (ref 3–18)
AST SERPL-CCNC: 35 U/L (ref 10–38)
BASOPHILS # BLD: 0 K/UL (ref 0–0.1)
BASOPHILS NFR BLD: 1 % (ref 0–2)
BILIRUB SERPL-MCNC: 0.8 MG/DL (ref 0.2–1)
BUN SERPL-MCNC: 18 MG/DL (ref 7–18)
BUN/CREAT SERPL: 15 (ref 12–20)
CALCIUM SERPL-MCNC: 8.5 MG/DL (ref 8.5–10.1)
CHLORIDE SERPL-SCNC: 111 MMOL/L (ref 100–111)
CHOLEST SERPL-MCNC: 232 MG/DL
CO2 SERPL-SCNC: 24 MMOL/L (ref 21–32)
CREAT SERPL-MCNC: 1.22 MG/DL (ref 0.6–1.3)
DIFFERENTIAL METHOD BLD: ABNORMAL
EOSINOPHIL # BLD: 0.1 K/UL (ref 0–0.4)
EOSINOPHIL NFR BLD: 3 % (ref 0–5)
ERYTHROCYTE [DISTWIDTH] IN BLOOD BY AUTOMATED COUNT: 13.2 % (ref 11.6–14.5)
EST. AVERAGE GLUCOSE BLD GHB EST-MCNC: 123 MG/DL
GLOBULIN SER CALC-MCNC: 3.3 G/DL (ref 2–4)
GLUCOSE SERPL-MCNC: 97 MG/DL (ref 74–99)
HBA1C MFR BLD: 5.9 % (ref 4.2–5.6)
HCT VFR BLD AUTO: 37.5 % (ref 35–45)
HDLC SERPL-MCNC: 96 MG/DL (ref 40–60)
HDLC SERPL: 2.4 {RATIO} (ref 0–5)
HGB BLD-MCNC: 12 G/DL (ref 12–16)
LDLC SERPL CALC-MCNC: 94.8 MG/DL (ref 0–100)
LIPID PROFILE,FLP: ABNORMAL
LYMPHOCYTES # BLD: 1.8 K/UL (ref 0.9–3.6)
LYMPHOCYTES NFR BLD: 38 % (ref 21–52)
MCH RBC QN AUTO: 29.8 PG (ref 24–34)
MCHC RBC AUTO-ENTMCNC: 32 G/DL (ref 31–37)
MCV RBC AUTO: 93.1 FL (ref 74–97)
MONOCYTES # BLD: 0.5 K/UL (ref 0.05–1.2)
MONOCYTES NFR BLD: 10 % (ref 3–10)
NEUTS SEG # BLD: 2.4 K/UL (ref 1.8–8)
NEUTS SEG NFR BLD: 49 % (ref 40–73)
PLATELET # BLD AUTO: 236 K/UL (ref 135–420)
PMV BLD AUTO: 10.9 FL (ref 9.2–11.8)
POTASSIUM SERPL-SCNC: 3.8 MMOL/L (ref 3.5–5.5)
PROT SERPL-MCNC: 7.5 G/DL (ref 6.4–8.2)
RBC # BLD AUTO: 4.03 M/UL (ref 4.2–5.3)
SODIUM SERPL-SCNC: 142 MMOL/L (ref 136–145)
TRIGL SERPL-MCNC: 206 MG/DL (ref ?–150)
VIT B12 SERPL-MCNC: 1846 PG/ML (ref 211–911)
VLDLC SERPL CALC-MCNC: 41.2 MG/DL
WBC # BLD AUTO: 4.8 K/UL (ref 4.6–13.2)

## 2021-06-04 PROCEDURE — 80061 LIPID PANEL: CPT

## 2021-06-04 PROCEDURE — 99214 OFFICE O/P EST MOD 30 MIN: CPT | Performed by: LEGAL MEDICINE

## 2021-06-04 PROCEDURE — 82607 VITAMIN B-12: CPT

## 2021-06-04 PROCEDURE — 82306 VITAMIN D 25 HYDROXY: CPT

## 2021-06-04 PROCEDURE — 83036 HEMOGLOBIN GLYCOSYLATED A1C: CPT

## 2021-06-04 PROCEDURE — 85025 COMPLETE CBC W/AUTO DIFF WBC: CPT

## 2021-06-04 PROCEDURE — 80053 COMPREHEN METABOLIC PANEL: CPT

## 2021-06-04 PROCEDURE — 36415 COLL VENOUS BLD VENIPUNCTURE: CPT

## 2021-06-04 RX ORDER — LOSARTAN POTASSIUM 50 MG/1
50 TABLET ORAL DAILY
Qty: 90 TABLET | Refills: 1 | Status: SHIPPED | OUTPATIENT
Start: 2021-06-04 | End: 2021-09-02 | Stop reason: SDUPTHER

## 2021-06-04 RX ORDER — PHENOL/SODIUM PHENOLATE
20 AEROSOL, SPRAY (ML) MUCOUS MEMBRANE DAILY
Qty: 90 TABLET | Refills: 0 | Status: SHIPPED | OUTPATIENT
Start: 2021-06-04 | End: 2021-09-02 | Stop reason: SDUPTHER

## 2021-06-04 RX ORDER — SIMVASTATIN 20 MG/1
TABLET, FILM COATED ORAL
Qty: 90 TABLET | Refills: 0 | Status: SHIPPED | OUTPATIENT
Start: 2021-06-04 | End: 2021-09-02 | Stop reason: SDUPTHER

## 2021-06-04 RX ORDER — ALBUTEROL SULFATE 90 UG/1
2 AEROSOL, METERED RESPIRATORY (INHALATION)
Qty: 3 INHALER | Refills: 0 | Status: SHIPPED | OUTPATIENT
Start: 2021-06-04 | End: 2022-03-23 | Stop reason: SDUPTHER

## 2021-06-04 RX ORDER — CHOLECALCIFEROL (VITAMIN D3) 50 MCG
CAPSULE ORAL
COMMUNITY
End: 2022-02-03

## 2021-06-04 RX ORDER — MECLIZINE HYDROCHLORIDE 25 MG/1
25 TABLET ORAL
Qty: 180 TABLET | Refills: 0 | Status: SHIPPED | OUTPATIENT
Start: 2021-06-04 | End: 2021-09-02 | Stop reason: SDUPTHER

## 2021-06-04 RX ORDER — CETIRIZINE HYDROCHLORIDE 5 MG/1
5 TABLET ORAL
Qty: 90 TABLET | Refills: 0 | Status: SHIPPED | OUTPATIENT
Start: 2021-06-04 | End: 2021-09-02 | Stop reason: SDUPTHER

## 2021-06-04 NOTE — PROGRESS NOTES
Tisha Lee     Chief Complaint   Patient presents with    Transfer Of Care    Medication Refill     Vitals:    21 1033 21 1116   BP: (!) 152/80 138/74   Pulse: 72    Resp: 16    Temp: 97 °F (36.1 °C)    TempSrc: Temporal    SpO2: 100%    Weight: 112 lb (50.8 kg)    Height: 4' 9\" (1.448 m)          HPI:Pateinsteffen is here with her daughter in law April to establish care  And to For follow up    Has been off HTN medication  For about 1 month her daughter who lives in [place] she told her she does not need the medications !! Blood pressure is elevated today  Patient has no headache no blurred vision no shortness of breath or chest pain    Daughter-in-law is concerned about her walking her gait has been weak, she received physical therapy in [place], patient can be sent for physical therapy evaluation. She is also concerned about memory loss that has been happening for few months    Feels sad  and depressed  Due to family situation   But Declined PHQ 9 and WALT screening declined psych therapy and not considering any medication to help with her symptoms        No past medical history on file. Past Surgical History:   Procedure Laterality Date    HX TUBAL LIGATION       Social History     Tobacco Use    Smoking status: Former Smoker     Packs/day: 0.25     Years: 45.00     Pack years: 11.25     Quit date: 9/15/2015     Years since quittin.7    Smokeless tobacco: Current User    Tobacco comment: vape   Substance Use Topics    Alcohol use: No     Alcohol/week: 0.0 standard drinks       Family History   Problem Relation Age of Onset    Heart Disease Brother     Heart Disease Sister        Review of Systems   Constitutional: Negative for chills, fever, malaise/fatigue and weight loss. HENT: Negative for congestion, ear discharge, ear pain, hearing loss, nosebleeds, sinus pain and sore throat. Eyes: Negative for blurred vision, double vision and discharge.    Respiratory: Negative for cough, hemoptysis, sputum production, shortness of breath and wheezing. Cardiovascular: Negative for chest pain, palpitations, claudication and leg swelling. Gastrointestinal: Positive for constipation. Negative for abdominal pain, blood in stool, diarrhea, melena, nausea and vomiting. Genitourinary: Negative for dysuria, frequency, hematuria and urgency. Musculoskeletal: Positive for back pain and myalgias. Negative for falls, joint pain and neck pain. Skin: Negative for itching and rash. Neurological: Positive for dizziness. Negative for tingling, sensory change, speech change, focal weakness, seizures, weakness and headaches. Psychiatric/Behavioral: Positive for depression. Negative for hallucinations, memory loss, substance abuse and suicidal ideas. The patient is nervous/anxious. The patient does not have insomnia. Physical Exam  Vitals and nursing note reviewed. Constitutional:       General: She is not in acute distress. Appearance: She is well-developed. She is not diaphoretic. HENT:      Head: Normocephalic and atraumatic. Eyes:      General: No scleral icterus. Right eye: No discharge. Left eye: No discharge. Conjunctiva/sclera: Conjunctivae normal.   Neck:      Thyroid: No thyromegaly. Cardiovascular:      Rate and Rhythm: Normal rate and regular rhythm. Heart sounds: Normal heart sounds. Pulmonary:      Effort: Pulmonary effort is normal. No respiratory distress. Breath sounds: Normal breath sounds. No wheezing or rales. Chest:      Chest wall: No tenderness. Abdominal:      General: There is no distension. Palpations: Abdomen is soft. Tenderness: There is no abdominal tenderness. There is no rebound. Musculoskeletal:         General: No tenderness or deformity. Normal range of motion. Lymphadenopathy:      Cervical: No cervical adenopathy. Skin:     General: Skin is warm and dry. Coloration: Skin is not pale. Findings: No erythema or rash. Neurological:      Mental Status: She is alert and oriented to person, place, and time. Cranial Nerves: No cranial nerve deficit. Coordination: Coordination normal.   Psychiatric:         Behavior: Behavior normal.         Thought Content: Thought content normal.         Judgment: Judgment normal.          Assessment and plan     Plan of care has been discussed with the patient, he agrees to the plan and verbalized understanding. All his questions were answered  More than 50% of the time spent in this visit was counseling the patient about  illness and treatment options         1. Bronchospasm    Use inhaler only as needed couple times a week  - albuterol (PROVENTIL HFA, VENTOLIN HFA, PROAIR HFA) 90 mcg/actuation inhaler; Take 2 Puffs by inhalation every six (6) hours as needed for Wheezing. Dispense: 3 Inhaler; Refill: 0    2. Allergic rhinitis, unspecified seasonality, unspecified trigger  Stable on Zyrtec as needed  - cetirizine (ZYRTEC) 5 mg tablet; Take 1 Tablet by mouth daily as needed for Allergies. Indications: inflammation of the nose due to an allergy  Dispense: 90 Tablet; Refill: 0    3. Essential hypertension  Patient need to resume losartan 50 mg daily  - losartan (COZAAR) 50 mg tablet; Take 1 Tablet by mouth daily for 90 days. Dispense: 90 Tablet; Refill: 1  - METABOLIC PANEL, COMPREHENSIVE; Future  - LIPID PANEL; Future  - CBC WITH AUTOMATED DIFF; Future  - HEMOGLOBIN A1C W/O EAG; Future    4. Hypercholesteremia  Resume Zocor 20 mg daily  - simvastatin (ZOCOR) 20 mg tablet; TAKE 1 TABLET BY MOUTH NIGHTLY AT BEDTIME  Indications: high cholesterol  Dispense: 90 Tablet; Refill: 0  - METABOLIC PANEL, COMPREHENSIVE; Future  - LIPID PANEL; Future  - CBC WITH AUTOMATED DIFF; Future    5. Gastroesophageal reflux disease without esophagitis    - Omeprazole delayed release (PRILOSEC D/R) 20 mg tablet; Take 1 Tablet by mouth daily. Dispense: 90 Tablet;  Refill: 0    6. BPPV (benign paroxysmal positional vertigo), right  She takes meclizine only as needed  - meclizine (ANTIVERT) 25 mg tablet; Take 1 Tablet by mouth two (2) times daily as needed for Dizziness. Indications: sensation of spinning or whirling  Dispense: 180 Tablet; Refill: 0    7. Chronic bilateral low back pain with left-sided sciatica  Advised not to take naproxen or ibuprofen or any NSAID to take Tylenol as needed    8. Stage 3 chronic kidney disease, unspecified whether stage 3a or 3b CKD (HCC)    - METABOLIC PANEL, COMPREHENSIVE; Future  - CBC WITH AUTOMATED DIFF; Future  - HEMOGLOBIN A1C W/O EAG; Future    9. Prediabetes    - METABOLIC PANEL, COMPREHENSIVE; Future  - CBC WITH AUTOMATED DIFF; Future  - HEMOGLOBIN A1C W/O EAG; Future    10. Vitamin D deficiency    - VITAMIN D, 25 HYDROXY; Future    11. Vitamin B12 deficiency    - VITAMIN B12; Future    12. Smoking greater than 30 pack years  Currently losing vape and is tapering off her nicotine  13. Memory loss    - REFERRAL TO NEUROLOGY    14. Abnormal gait    - REFERRAL TO NEUROLOGY  - REFERRAL TO PHYSICAL THERAPY    Current Outpatient Medications   Medication Sig Dispense Refill    albuterol (PROVENTIL HFA, VENTOLIN HFA, PROAIR HFA) 90 mcg/actuation inhaler Take 2 Puffs by inhalation every six (6) hours as needed for Wheezing. 3 Inhaler 0    cetirizine (ZYRTEC) 5 mg tablet Take 1 Tablet by mouth daily as needed for Allergies. Indications: inflammation of the nose due to an allergy 90 Tablet 0    losartan (COZAAR) 50 mg tablet Take 1 Tablet by mouth daily for 90 days. 90 Tablet 1    simvastatin (ZOCOR) 20 mg tablet TAKE 1 TABLET BY MOUTH NIGHTLY AT BEDTIME  Indications: high cholesterol 90 Tablet 0    Omeprazole delayed release (PRILOSEC D/R) 20 mg tablet Take 1 Tablet by mouth daily. 90 Tablet 0    meclizine (ANTIVERT) 25 mg tablet Take 1 Tablet by mouth two (2) times daily as needed for Dizziness.  Indications: sensation of spinning or whirling 180 Tablet 0    omega 3-dha-epa-fish oil (Fish Oil) 100-160-1,000 mg cap Take  by mouth.  cholecalciferol (VITAMIN D3) (2,000 UNITS /50 MCG) cap capsule Take 1 Capsule by mouth daily for 90 days. 90 Capsule 1    garlic 2,799 mg cap One daily 90 Capsule 0    multivit/folic acid/vit K1 (ONE-A-DAY WOMEN'S 50 PLUS PO) Take  by mouth daily. Patient Active Problem List    Diagnosis Date Noted    Hypercholesteremia 10/28/2015    Essential hypertension 10/15/2015    Vitamin D deficiency 10/18/2011    HTN (hypertension) 10/17/2011     Results for orders placed or performed during the hospital encounter of 06/88/82   METABOLIC PANEL, COMPREHENSIVE   Result Value Ref Range    Sodium 141 136 - 145 mmol/L    Potassium 4.9 3.5 - 5.5 mmol/L    Chloride 109 100 - 111 mmol/L    CO2 27 21 - 32 mmol/L    Anion gap 5 3.0 - 18 mmol/L    Glucose 108 (H) 74 - 99 mg/dL    BUN 12 7.0 - 18 MG/DL    Creatinine 1.28 0.6 - 1.3 MG/DL    BUN/Creatinine ratio 9 (L) 12 - 20      GFR est AA 51 (L) >60 ml/min/1.73m2    GFR est non-AA 42 (L) >60 ml/min/1.73m2    Calcium 8.8 8.5 - 10.1 MG/DL    Bilirubin, total 0.4 0.2 - 1.0 MG/DL    ALT (SGPT) 18 13 - 56 U/L    AST (SGOT) 17 10 - 38 U/L    Alk. phosphatase 108 45 - 117 U/L    Protein, total 7.4 6.4 - 8.2 g/dL    Albumin 4.2 3.4 - 5.0 g/dL    Globulin 3.2 2.0 - 4.0 g/dL    A-G Ratio 1.3 0.8 - 1.7     LIPID PANEL   Result Value Ref Range    LIPID PROFILE          Cholesterol, total 205 (H) <200 MG/DL    Triglyceride 81 <150 MG/DL    HDL Cholesterol 78 (H) 40 - 60 MG/DL    LDL, calculated 110.8 (H) 0 - 100 MG/DL    VLDL, calculated 16.2 MG/DL    CHOL/HDL Ratio 2.6 0 - 5.0     HEMOGLOBIN A1C WITH EAG   Result Value Ref Range    Hemoglobin A1c 5.7 (H) 4.2 - 5.6 %    Est. average glucose 117 mg/dL     No visits with results within 3 Month(s) from this visit.    Latest known visit with results is:   Hospital Outpatient Visit on 08/08/2019   Component Date Value Ref Range Status    Sodium 08/08/2019 141  136 - 145 mmol/L Final    Potassium 08/08/2019 4.9  3.5 - 5.5 mmol/L Final    Chloride 08/08/2019 109  100 - 111 mmol/L Final    PLEASE NOTE NEW REFERENCE RANGE    CO2 08/08/2019 27  21 - 32 mmol/L Final    Anion gap 08/08/2019 5  3.0 - 18 mmol/L Final    Glucose 08/08/2019 108* 74 - 99 mg/dL Final    BUN 08/08/2019 12  7.0 - 18 MG/DL Final    Creatinine 08/08/2019 1.28  0.6 - 1.3 MG/DL Final    BUN/Creatinine ratio 08/08/2019 9* 12 - 20   Final    GFR est AA 08/08/2019 51* >60 ml/min/1.73m2 Final    GFR est non-AA 08/08/2019 42* >60 ml/min/1.73m2 Final    Comment: (NOTE)  Estimated GFR is calculated using the Modification of Diet in Renal   Disease (MDRD) Study equation, reported for both  Americans   (GFRAA) and non- Americans (GFRNA), and normalized to 1.73m2   body surface area. The physician must decide which value applies to   the patient. The MDRD study equation should only be used in   individuals age 25 or older. It has not been validated for the   following: pregnant women, patients with serious comorbid conditions,   or on certain medications, or persons with extremes of body size,   muscle mass, or nutritional status.  Calcium 08/08/2019 8.8  8.5 - 10.1 MG/DL Final    Bilirubin, total 08/08/2019 0.4  0.2 - 1.0 MG/DL Final    ALT (SGPT) 08/08/2019 18  13 - 56 U/L Final    AST (SGOT) 08/08/2019 17  10 - 38 U/L Final    PLEASE NOTE NEW REFERENCE RANGE    Alk.  phosphatase 08/08/2019 108  45 - 117 U/L Final    Protein, total 08/08/2019 7.4  6.4 - 8.2 g/dL Final    Albumin 08/08/2019 4.2  3.4 - 5.0 g/dL Final    Globulin 08/08/2019 3.2  2.0 - 4.0 g/dL Final    A-G Ratio 08/08/2019 1.3  0.8 - 1.7   Final    LIPID PROFILE 08/08/2019        Final    Cholesterol, total 08/08/2019 205* <200 MG/DL Final    Triglyceride 08/08/2019 81  <150 MG/DL Final    Comment: The drugs N-acetylcysteine (NAC) and  Metamiszole have been found to cause falsely  low results in this chemical assay. Please  be sure to submit blood samples obtained  BEFORE administration of either of these  drugs to assure correct results.  HDL Cholesterol 08/08/2019 78* 40 - 60 MG/DL Final    LDL, calculated 08/08/2019 110.8* 0 - 100 MG/DL Final    VLDL, calculated 08/08/2019 16.2  MG/DL Final    CHOL/HDL Ratio 08/08/2019 2.6  0 - 5.0   Final    Hemoglobin A1c 08/08/2019 5.7* 4.2 - 5.6 % Final    Comment: (NOTE)  HbA1C Interpretive Ranges  <5.7              Normal  5.7 - 6.4         Consider Prediabetes  >6.5              Consider Diabetes      Est. average glucose 08/08/2019 117  mg/dL Final    Comment: (NOTE)  The eAG should be interpreted with patient characteristics in mind   since ethnicity, interindividual differences, red cell lifespan,   variation in rates of glycation, etc. may affect the validity of the   calculation. Follow-up and Dispositions    · Return in about 1 month (around 7/4/2021) for for medicare wellness.

## 2021-06-04 NOTE — PROGRESS NOTES
Lou Odell is a 77 y.o. female (: 1955) presenting to address:    Chief Complaint   Patient presents with    Transfer Of Care    Medication Refill       Vitals:    21 1033 21 1116   BP: (!) 152/80 138/74   Pulse: 72    Resp: 16    Temp: 97 °F (36.1 °C)    TempSrc: Temporal    SpO2: 100%    Weight: 112 lb (50.8 kg)    Height: 4' 9\" (1.448 m)        Is someone accompanying this pt? YES daughter in law April     Is the patient using any DME equipment during [address]? NO    Hearing/Vision:   No exam data present    Learning Assessment:     Learning Assessment 10/15/2015   PRIMARY LEARNER Patient   HIGHEST LEVEL OF EDUCATION - PRIMARY LEARNER  GRADUATED HIGH SCHOOL OR GED   BARRIERS PRIMARY LEARNER LANGUAGE   CO-LEARNER CAREGIVER Yes   CO-LEARNER NAME Azra   CO-LEARNER HIGHEST LEVEL OF EDUCATION GRADUATED HIGH SCHOOL OR GED   BARRIERS CO-LEARNER NONE   PRIMARY LANGUAGE ENGLISH   PRIMARY LANGUAGE CO-LEARNER ENGLISH    NEED No   LEARNER PREFERENCE PRIMARY LISTENING   LEARNER PREFERENCE CO-LEARNER LISTENING   LEARNING SPECIAL TOPICS no   ANSWERED BY self   RELATIONSHIP SELF     Depression Screening:     3 most recent PHQ Screens 2021   Little interest or pleasure in doing things Not at all   Feeling down, depressed, irritable, or hopeless Not at all   Total Score PHQ 2 0     Fall Risk Assessment:     Fall Risk Assessment, last 12 mths 2021   Able to walk? Yes   Fall in past 12 months? 0   Do you feel unsteady? 0   Are you worried about falling 0     Coordination of Care Questionaire:   1. Have you been to the ER, urgent care clinic since your last visit? Hospitalized since your last visit? NO    2. Have you seen or consulted any other health care providers outside of the [address] [place] [address] since your last visit? Include any pap smears or colon screening. NO    Advanced Directive:   1. Do you have an Advanced Directive? NO    2.  Would you like information on Advanced Directives?  NO

## 2021-06-07 NOTE — PROGRESS NOTES
Vitamin B12 is elevated to stop vitamin B complex if taking any ,  HB A1c is 5.9 prediabetes range continue lefestyle modifications and healthy eating      has kidney disease stage 3 avoids all NSAIDs ,like ibuprofen ,Naproxen ,motrin can only take tylenol as needed

## 2021-06-08 NOTE — PROGRESS NOTES
The amount of vitamin b12 in centum silver is not excessive     Elevated b12 is not harmful it will be cleared by urine

## 2021-06-08 NOTE — PROGRESS NOTES
Daughter was on the phone as well and she stated her mother is taking Centrum Silver 55+, she stated could it be anything else that's causing the elevation?

## 2021-06-09 NOTE — PROGRESS NOTES
Spoke with relative and she stated that actually patient was taking B12 25,000units and a energy pill with b12 and B6 and B3. She stated that she stopped them for now.

## 2021-06-22 ENCOUNTER — HOSPITAL ENCOUNTER (OUTPATIENT)
Dept: PHYSICAL THERAPY | Age: 66
Discharge: HOME | End: 2021-06-22
Attending: LEGAL MEDICINE
Payer: MEDICARE

## 2021-06-22 PROCEDURE — 97162 PT EVAL MOD COMPLEX 30 MIN: CPT

## 2021-06-22 NOTE — PROGRESS NOTES
[facility] PHYSICAL THERAPY   [facility] [address],Denise Donnelly, [facility] - Phone: [phone]  Fax: [phone] / [facility]  Patient Name: Samy Edwards : 1955   Medical   Diagnosis: Other abnormalities of gait and mobility [R26.89] Treatment Diagnosis: Balance, Vertigo, Low Back Pain   Onset Date: Chronic     Referral Source: Fortunato Bach MD Start of Care [facility]): 2021   Prior Hospitalization: See medical history Provider #: [id number]   Prior Level of Function: Chronic L/S Pain,    Comorbidities: Memory Loss, HBP, Visually Impaired, Asthma   Medications: Verified on Patient Summary List   The Plan of Care and following information is based on the information from the initial evaluation.   ==================================================================================  Assessment / key information:   Samy Edwards is a 77 y.o.  yo female with Dx: Other abnormalities of gait and mobility [R26.89]. Pt reports chronic decrease in balance as well as increases in vertigo and L/S Pain. Pt's daughter in law and son verbalize patient has been demonstrating increased memory loss over the last several months. Pt reports she has pain in the L/S across the lower back, increased with longer periods of sitting and walking. Pt denies any falls, however reports increased dizziness with positional changes. Pt reports walking 1 hours per day in their court and denies any difficulty with stair negotiation. Pain at best: 5/10  Pain at Worst: 5/10  Alleviating Factors: walking, stretching  Aggravating Factors: standing longer periods of time, sitting longer periods of time  Previous PT: HHPT several months ago  PMHx: HBP, vertigo, dizziness, visually impaired  Red Flags: None    Objective Data:   Posture WNL.    Gait shuffling gait pattern of the BL LE's and feet, however able to amb with normalized gait when cued. No use of AD. Palpation TTP along the L/S paraspinals. AROM of the L/S limited by 50% in all directions with mild reports of pain  Strength of the LE's grossly 4-/5 in all joints. Special Tests: NA.   Functional limitations at this time include difficulty with walking, standing, stair negotiation. .  Balance: unable to stand with SLS for greater then 3-4 seconds with needed UE support  The patient was instructed in a home exercise program to address the above findings/deficits. The patient would benefit from skilled physical therapy at this time to address the above functional deficits.       ==================================================================================  Eval Complexity: History HIGH Complexity :3+ comorbidities / personal factors will impact the outcome/ POC ;  Examination  HIGH Complexity : 4+ Standardized tests and measures addressing body structure, function, activity limitation and / or participation in recreation ; Presentation MEDIUM Complexity : Evolving with changing characteristics ;   Decision Making MEDIUM Complexity : FOTO score of 26-74; Overall Complexity MEDIUM  Problem List: pain affecting function, decrease ROM, decrease strength, impaired gait/ balance, decrease ADL/ functional abilitiies, decrease activity tolerance, decrease flexibility/ joint mobility and decrease transfer abilities   Treatment Plan may include any combination of the following: Therapeutic exercise, Therapeutic activities, Neuromuscular re-education, Physical agent/modality, Gait/balance training, Manual therapy, Patient education, Self Care training, Functional mobility training, Home safety training and Stair training  Patient / Family readiness to learn indicated by: asking questions, trying to perform skills and interest  Persons(s) to be included in education: patient (P) and family support person (FSP);list son and daughter in law  Barriers to Learning/Limitations: None  Measures taken, if barriers to learning:    Patient Goal (s): Improve balance/pain   Patient self reported health status: fair  Rehabilitation Potential: good   Short Term Goals: To be accomplished in  4  treatments:  Pt will be I with HEP in order to improve functional ADL's  Pt will report max pain <5/10 in order to improve functional ADL's       Long Term Goals: To be accomplished in  8  treatments:  Pt will be I with advanced HEP in order to self manage symptoms following DC from therapy. Pt will indicate +5 on the GROC in order to demonstrate significant improvement in therapy. Pt will be able to perform ambulation with normalized gait pattern for 500 feet in order to demonstrate safe community ambulation      Frequency / Duration:   Patient to be seen  2  times per week for 8  treatments:  Patient / Caregiver education and instruction: exercises  G-Codes (GP): NA  Therapist Signature: Guy Espinoza PT, DPT   Date: 4/00/6139   Certification Period: 6/22/2021-9/21/2021 Time: 7:39 AM   ==================================================================================  I certify that the above Physical Therapy Services are being furnished while the patient is under my care. I agree with the treatment plan and certify that this therapy is necessary. Physician Signature:        Date: _______Tme:___   Fortunato Bach MD    Please sign and return to [facility] [place]. or you may fax the signed copy to [phone]. Thank you.

## 2021-06-22 NOTE — PROGRESS NOTES
PHYSICAL THERAPY - DAILY TREATMENT NOTE    Patient Name: Jeana Chavez        Date: 2021  : 1955   yes Patient  Verified  Visit #:   1   of   8  Insurance: Payor: Amaury Hayden / Plan: [place] MED[place]ARE PART A & B / Product Type: Medicare /      In time: 131 Out time: 9974   Total Treatment Time: 30     Medicare/BCBS Time Tracking (below)   Total Timed Codes (min):  na 1:1 Treatment Time:  na     TREATMENT AREA =  Other abnormalities of gait and mobility [R26.89]    SUBJECTIVE  Pain Level (on 0 to 10 scale):  5  / 10   Medication Changes/New allergies or changes in medical history, any new surgeries or procedures?    no  If yes, update Summary List   Subjective Functional Status/Changes:  []  No changes reported     SEE POC         OBJECTIVE  30 min Evaluation     Billed With/As:   [] TE   [] TA   [] Neuro   [] Self Care Patient Education: [x] Review HEP    [] Progressed/Changed HEP based on:   [] positioning   [] body mechanics   [] transfers   [] heat/ice application    [] other:      Other Objective/Functional Measures:    SEE POC     Post Treatment Pain Level (on 0 to 10) scale:   5  / 10     ASSESSMENT  Assessment/Changes in Function:     Evaluation Code Complexity: History HIGH Complexity :3+ comorbidities / personal factors will impact the outcome/ POC ; Examination HIGH Complexity : 4+ Standardized tests and measures addressing body structure, function, activity limitation and / or participation in recreation ; Presentation MEDIUM Complexity : Evolving with changing characteristics ; Decision Making MEDIUM Complexity : FOTO score of 26-74;  Complexity MEDIUM    Justification for Eval Code Complexity:  Patient History : SEE CHART  Examination SEE ABOVE EXAM  Clinical Presentation: foto  Clinical Decision Making : FOTO          []  See Progress Note/Recertification   Patient will continue to benefit from skilled PT services to modify and progress therapeutic interventions, address functional mobility deficits, address ROM deficits, address strength deficits, analyze and address soft tissue restrictions, analyze and cue movement patterns, analyze and modify body mechanics/ergonomics and assess and modify postural abnormalities to attain remaining goals. Progress toward goals / Updated goals:    SEE POC     PLAN  []  Upgrade activities as tolerated yes Continue plan of care   []  Discharge due to :    [x]  Other: Pt will be seen 2 times per week for 8 sessions.       Therapist: Marlin Mancuso PT, DPT    Date: 6/22/2021 Time: 7:40 AM     Future Appointments   Date Time Provider Luma Monsivais   6/22/2021  9:30 AM Julio Panda, PT [id number]

## 2021-06-29 ENCOUNTER — HOSPITAL ENCOUNTER (OUTPATIENT)
Dept: PHYSICAL THERAPY | Age: 66
Discharge: HOME | End: 2021-06-29
Attending: LEGAL MEDICINE
Payer: MEDICARE

## 2021-06-29 PROCEDURE — 97110 THERAPEUTIC EXERCISES: CPT

## 2021-06-29 PROCEDURE — 97530 THERAPEUTIC ACTIVITIES: CPT

## 2021-06-29 PROCEDURE — 97112 NEUROMUSCULAR REEDUCATION: CPT

## 2021-06-29 NOTE — PROGRESS NOTES
PHYSICAL THERAPY - DAILY TREATMENT NOTE    Patient Name: Roberta Pozo        Date: 2021  : 1955   yes Patient  Verified  Visit #:   2   of   8  Insurance: Payor: Amy Wale / Plan: [place] MED[place]ARE PART A & B / Product Type: Medicare /      In time: 8295 Out time: 3185   Total Treatment Time: 45     Medicare/BCBS Time Tracking (below)   Total Timed Codes (min):  45 1:1 Treatment Time:  40     TREATMENT AREA =  Balance problem [R26.89]  Low back pain [M54.5]    SUBJECTIVE  Pain Level (on 0 to 10 scale):  \"its ok\" (regarding back)  / 10   Medication Changes/New allergies or changes in medical history, any new surgeries or procedures?    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 Added PMHx to chart. OBJECTIVE  15/10 (1:1) min Therapeutic Exercise:  [x]  See flow sheet   Rationale:      increase ROM, increase strength, improve balance and increase proprioception to improve the patients ability to perform activities and decrease pain with movement. 9 min Therapeutic Activity: [x]  See flow sheet  Body part BLEs/balance;  Assessment in rolling, bed mobility, transfers (sit-stand and ambulatory)   Rationale:    improve balance and safety to  improve the patients ability to Tolerate basic ADLs and job-related tasks without pain. 21 min Neuromuscular Re-ed: [x]  See flow sheet   Rationale:    improve balance and increase proprioception  to improve the patients ability to perform activities with good form and proprioception with tactile and verbal cuing appropriately in order to develop conscious control of individual muscles and awareness of position of extremities.      Billed With/As:   [x] TE   [] TA   [] Neuro   [] Self Care Patient Education: [x] Review HEP    [] Progressed/Changed HEP based on:   [x] positioning   [x] body mechanics   [x] transfers   [] heat/ice application    [] other:      Other Objective/Functional Measures:  Initiated therex as per POC     Post Treatment Pain Level (on 0 to 10) scale:   0  / 10     ASSESSMENT  Assessment/Changes in Function:   Visual, verbal, and tactile feedback throughout all exercises for initiation, performance, and follow through. Patient with shuffling gait which progressed during session, decreased weightshifting onto LLE in stance phase, and B knee flexion throughout entirety of gait cycle. Patient with motor planning difficulties evident with bed mobility and stair negotiation and unknown if chronic (possbility apraxic d/t hx of CVAs) or if from language barrier/visual impairments. Patient with a + vertiginous episode with bed mobility (rolling) which lasted < 1 min and subsided. She was encouraged to speak with MD about possible vestibular rehab to address and she reports a long history of vertigo. Pt with verbal understanding and left in no apparent distress. []  See Progress Note/Recertification   Patient will continue to benefit from skilled PT services to modify and progress therapeutic interventions to attain remaining goals. Progress toward goals / Updated goals: · Short Term Goals: To be accomplished in  4  treatments:  Pt will be I with HEP in order to improve functional ADL's  Pt will report max pain <5/10 in order to improve functional ADL's        · Long Term Goals: To be accomplished in  8  treatments:  Pt will be I with advanced HEP in order to self manage symptoms following DC from therapy. Pt will indicate +5 on the GROC in order to demonstrate significant improvement in therapy.   Pt will be able to perform ambulation with normalized gait pattern for 500 feet in order to demonstrate safe community ambulation        PLAN  [x]  Upgrade activities as tolerated yes Continue plan of care   []  Discharge due to :    []  Other: Therapist: Nuno Knutson PT    Date: 6/29/2021 Time: 7:51 AM     Future Appointments   Date Time Provider Luma Yodit   6/29/2021 10:30 AM [facility] [address] [facility]   7/2/2021 12:00 PM Kathryn Panda,  [address] [facility]   7/6/2021 10:30 AM [facility] PT [facility] 1 [address] [facility]   7/8/2021 10:45 AM [facility]   7/13/2021 10:30 AM [facility] PT [place] [id number] [address]   7/15/2021 10:45 AM [facility]   7/20/2021 11:15 AM [facility]   7/22/2021 11:30 AM Kathryn Panda, PT [address]   7/27/2021 11:15 AM [facility]   7/29/2021 11:30 AM Kathryn Panda, PT [address]

## 2021-07-02 ENCOUNTER — HOSPITAL ENCOUNTER (OUTPATIENT)
Dept: PHYSICAL THERAPY | Age: 66
Discharge: HOME | End: 2021-07-02
Attending: LEGAL MEDICINE

## 2021-07-02 PROCEDURE — 97110 THERAPEUTIC EXERCISES: CPT

## 2021-07-02 PROCEDURE — 97530 THERAPEUTIC ACTIVITIES: CPT

## 2021-07-02 PROCEDURE — 97112 NEUROMUSCULAR REEDUCATION: CPT

## 2021-07-02 NOTE — PROGRESS NOTES
PHYSICAL THERAPY - DAILY TREATMENT NOTE    Patient Name: Salena Renteria        Date: 2021  : 1955   yes Patient  Verified  Visit #:   3   of   8  Insurance: Payor: Echo Sims / Plan: [place] MEDICARE PART A & B / Product Type: Medicare /      In time: 1200 Out time: 9480   Total Treatment Time: 40     Medicare/BCBS Time Tracking (below)   Total Timed Codes (min):  40 1:1 Treatment Time:  40     TREATMENT AREA =  Balance problem [R26.89]  Low back pain [M54.5]    SUBJECTIVE  Pain Level (on 0 to 10 scale): 0 (regarding back)  / 10   Medication Changes/New allergies or changes in medical history, any new surgeries or procedures?    no  If yes, update Summary List   Subjective Functional Status/Changes:  []  No changes reported     Pt reports feeling good today and reports no pain anywhere. OBJECTIVE  15 min Therapeutic Exercise:  [x]  See flow sheet   Rationale:      increase ROM, increase strength, improve balance and increase proprioception to improve the patients ability to perform activities and decrease pain with movement. 10 min Therapeutic Activity: [x]  See flow sheet  Body part BLEs/balance;   Rationale:    improve balance and safety to  improve the patients ability to Tolerate basic ADLs and job-related tasks without pain. 15 min Neuromuscular Re-ed: [x]  See flow sheet   Rationale:    improve balance and increase proprioception  to improve the patients ability to perform activities with good form and proprioception with tactile and verbal cuing appropriately in order to develop conscious control of individual muscles and awareness of position of extremities.      Billed With/As:   [x] TE   [] TA   [] Neuro   [] Self Care Patient Education: [x] Review HEP    [] Progressed/Changed HEP based on:   [x] positioning   [x] body mechanics   [x] transfers   [] heat/ice application    [] other:      Other Objective/Functional Measures:    Tactile cuing and mirroring required for all TE due to language barrier  Diffiuclty with controlled isolated movements of the LE's- educated pt on slow and controlled movements with concentration on mirroring PT. Post Treatment Pain Level (on 0 to 10) scale:   0  / 10     ASSESSMENT  Assessment/Changes in Function:     Educated pt to perform walking with increased step length     []  See Progress Note/Recertification   Patient will continue to benefit from skilled PT services to modify and progress therapeutic interventions to attain remaining goals. Progress toward goals / Updated goals: · Short Term Goals: To be accomplished in  4  treatments:  Pt will be I with HEP in order to improve functional ADL's  Pt will report max pain <5/10 in order to improve functional ADL's        · Long Term Goals: To be accomplished in  8  treatments:  Pt will be I with advanced HEP in order to self manage symptoms following DC from therapy. Pt will indicate +5 on the GROC in order to demonstrate significant improvement in therapy.   Pt will be able to perform ambulation with normalized gait pattern for 500 feet in order to demonstrate safe community ambulation        PLAN  [x]  Upgrade activities as tolerated yes Continue plan of care   []  Discharge due to :    []  Other:      Therapist: Eva Woodward PT    Date: 7/2/2021 Time: 7:51 AM     Future Appointments   Date Time Provider Luma Monsivais   7/2/2021 12:00 PM Marcelo Panda [facility]   7/6/2021 10:30 AM [address] [facility]   7/8/2021 10:45 AM Roxana Panda, PT [address]   7/13/2021 10:30 AM [address]   7/15/2021 10:45 AM Roxana Panda, PT [address]   7/20/2021 11:15 AM Roxana Panda, PT [facility]   7/22/2021 11:30 AM Roxana Panda, PT [address]   7/27/2021 11:15 AM Roxana Panda, PT [facility]   7/29/2021 11:30 AM Roxana Panda, PT [address]

## 2021-07-06 ENCOUNTER — HOSPITAL ENCOUNTER (OUTPATIENT)
Dept: PHYSICAL THERAPY | Age: 66
Discharge: HOME | End: 2021-07-06
Attending: LEGAL MEDICINE

## 2021-07-06 PROCEDURE — 97112 NEUROMUSCULAR REEDUCATION: CPT

## 2021-07-06 PROCEDURE — 97110 THERAPEUTIC EXERCISES: CPT

## 2021-07-06 PROCEDURE — 97530 THERAPEUTIC ACTIVITIES: CPT

## 2021-07-06 NOTE — PROGRESS NOTES
PHYSICAL THERAPY - DAILY TREATMENT NOTE    Patient Name: Javier Avendano        Date: 2021  : 1955   yes Patient  Verified  Visit #:   4   of   8  Insurance: Payor: Evelia Cohen / Plan: [place] MEDICARE PART A & B / Product Type: Medicare /      In time: 7999 Out time: 1136   Total Treatment Time: 40     Medicare/BCBS Time Tracking (below)   Total Timed Codes (min):  40 1:1 Treatment Time:  40     TREATMENT AREA =  Balance problem [R26.89]  Low back pain [M54.5]    SUBJECTIVE  Pain Level (on 0 to 10 scale): 0 (regarding back)  / 10   Medication Changes/New allergies or changes in medical history, any new surgeries or procedures?    no  If yes, update Summary List   Subjective Functional Status/Changes:  []  No changes reported     Pt without any pain       OBJECTIVE  15 min Therapeutic Exercise:  [x]  See flow sheet   Rationale:      increase ROM, increase strength, improve balance and increase proprioception to improve the patients ability to perform activities and decrease pain with movement. 10 min Therapeutic Activity: [x]  See flow sheet  Body part BLEs/balance   Rationale:    improve balance and safety to  improve the patients ability to Tolerate basic ADLs and job-related tasks without pain. 15 min Neuromuscular Re-ed: [x]  See flow sheet   Rationale:    improve balance and increase proprioception  to improve the patients ability to perform activities with good form and proprioception with tactile and verbal cuing appropriately in order to develop conscious control of individual muscles and awareness of position of extremities.      Billed With/As:   [x] TE   [] TA   [] Neuro   [] Self Care Patient Education: [x] Review HEP    [] Progressed/Changed HEP based on:   [x] positioning   [x] body mechanics   [x] transfers   [] heat/ice application    [] other:      Other Objective/Functional Measures:  Tactile cuing and mirroring required for all TE   + apraxic movements with bed mobility and transfers  Initiated jorge watt in semi-recumbent      Post Treatment Pain Level (on 0 to 10) scale:   0  / 10     ASSESSMENT  Assessment/Changes in Function:   Manual approximation at anterior pelvis for sit-stands. + dysmetria with heel shin testing. Added 1lb ankle weights for ambulation in clinic. []  See Progress Note/Recertification   Patient will continue to benefit from skilled PT services to modify and progress therapeutic interventions to attain remaining goals. Progress toward goals / Updated goals: · Short Term Goals: To be accomplished in  4  treatments:  Pt will be I with HEP in order to improve functional ADL's  Pt will report max pain <5/10 in order to improve functional ADL's-progressing 0/10 VAS 7/6/21     · Long Term Goals: To be accomplished in  8  treatments:  Pt will be I with advanced HEP in order to self manage symptoms following DC from therapy. Pt will indicate +5 on the GROC in order to demonstrate significant improvement in therapy.   Pt will be able to perform ambulation with normalized gait pattern for 500 feet in order to demonstrate safe community ambulation        PLAN  [x]  Upgrade activities as tolerated yes Continue plan of care   []  Discharge due to :    []  Other:      Therapist: Karma Reza PT    Date: 7/6/2021 Time: 7:51 AM     Future Appointments   Date Time Provider Luma Monsivais   7/8/2021 10:45 AM Cristian Panda, PT [address]   7/13/2021 10:30 AM 1000 [facility] [address] [facility]   7/15/2021 10:45 AM Cristian Panda PT [address]   7/20/2021 11:15 AM Cristian Panda, PT [facility]   7/22/2021 11:30 AM Cristian Panda, PT [address]   7/27/2021 11:15 AM Cristian Panda, PT [facility]   7/29/2021 11:30 AM Cristian Panda, PT [address]

## 2021-07-08 ENCOUNTER — HOSPITAL ENCOUNTER (OUTPATIENT)
Dept: PHYSICAL THERAPY | Age: 66
Discharge: HOME | End: 2021-07-08
Attending: LEGAL MEDICINE

## 2021-07-08 PROCEDURE — 97110 THERAPEUTIC EXERCISES: CPT

## 2021-07-08 PROCEDURE — 97530 THERAPEUTIC ACTIVITIES: CPT

## 2021-07-08 PROCEDURE — 97112 NEUROMUSCULAR REEDUCATION: CPT

## 2021-07-08 NOTE — PROGRESS NOTES
PHYSICAL THERAPY - DAILY TREATMENT NOTE    Patient Name: Salena Renteria        Date: 2021  : 1955   yes Patient  Verified  Visit #:   5   of   8  Insurance: Payor: Echo Sims / Plan: [place] MEDICARE PART A & B / Product Type: Medicare /      In time: 2408 Out time: 1205   Total Treatment Time: 40     Medicare/BCBS Time Tracking (below)   Total Timed Codes (min):  40 1:1 Treatment Time:  40     TREATMENT AREA =  Balance problem [R26.89]  Low back pain [M54.5]    SUBJECTIVE  Pain Level (on 0 to 10 scale): 0 (regarding back)  / 10   Medication Changes/New allergies or changes in medical history, any new surgeries or procedures?    no  If yes, update Summary List   Subjective Functional Status/Changes:  []  No changes reported     Pt denies any pain today. Pt demonstrated an exercises she does at home with standing on one leg and bending forward. OBJECTIVE  15 min Therapeutic Exercise:  [x]  See flow sheet   Rationale:      increase ROM, increase strength, improve balance and increase proprioception to improve the patients ability to perform activities and decrease pain with movement. 10 min Therapeutic Activity: [x]  See flow sheet  Body part BLEs/balance   Rationale:    improve balance and safety to  improve the patients ability to Tolerate basic ADLs and job-related tasks without pain. 15 min Neuromuscular Re-ed: [x]  See flow sheet   Rationale:    improve balance and increase proprioception  to improve the patients ability to perform activities with good form and proprioception with tactile and verbal cuing appropriately in order to develop conscious control of individual muscles and awareness of position of extremities.      Billed With/As:   [x] TE   [] TA   [] Neuro   [] Self Care Patient Education: [x] Review HEP    [] Progressed/Changed HEP based on:   [x] positioning   [x] body mechanics   [x] transfers   [] heat/ice application    [] other:      Other Objective/Functional Measures:    Cuing for proper form with all exercises for slow and controlled movement. Post Treatment Pain Level (on 0 to 10) scale:   0  / 10     ASSESSMENT  Assessment/Changes in Function:     Continues to need ample cuing for control with spastic movements. []  See Progress Note/Recertification   Patient will continue to benefit from skilled PT services to modify and progress therapeutic interventions to attain remaining goals. Progress toward goals / Updated goals: · Short Term Goals: To be accomplished in  4  treatments:  Pt will be I with HEP in order to improve functional ADL's  Pt will report max pain <5/10 in order to improve functional ADL's-progressing 0/10 VAS 7/6/21     · Long Term Goals: To be accomplished in  8  treatments:  Pt will be I with advanced HEP in order to self manage symptoms following DC from therapy. Pt will indicate +5 on the GROC in order to demonstrate significant improvement in therapy.   Pt will be able to perform ambulation with normalized gait pattern for 500 feet in order to demonstrate safe community ambulation        PLAN  [x]  Upgrade activities as tolerated yes Continue plan of care   []  Discharge due to :    []  Other:      Therapist: Amada Tierney PT    Date: 7/8/2021 Time: 7:51 AM     Future Appointments   Date Time Provider Luma Monsivais   7/13/2021 10:30 AM 1000 [facility] [address] 200 [facility]   7/15/2021 10:45 AM Vivien [address]anda [address]   7/20/2021 11:15 AM Ethan Panda PT [address]   7/22/2021 11:30 AM Ethan Panda PT [address]   7/27/2021 11:15 AM Ethan Panda, PT [facility]   7/29/2021 11:30 AM Ethan Panda PT [address]

## 2021-07-13 ENCOUNTER — HOSPITAL ENCOUNTER (OUTPATIENT)
Dept: PHYSICAL THERAPY | Age: 66
Discharge: HOME | End: 2021-07-13
Attending: LEGAL MEDICINE

## 2021-07-13 PROCEDURE — 97110 THERAPEUTIC EXERCISES: CPT

## 2021-07-13 PROCEDURE — 97112 NEUROMUSCULAR REEDUCATION: CPT

## 2021-07-13 PROCEDURE — 97116 GAIT TRAINING THERAPY: CPT

## 2021-07-13 NOTE — PROGRESS NOTES
PHYSICAL THERAPY - DAILY TREATMENT NOTE    Patient Name: Nico Sidhu        Date: 2021  : 1955   yes Patient  Verified  Visit #:   6  of   8  Insurance: Payor: Lyndsey Garrison / Plan: [place] MEDICARE PART A & B / Product Type: Medicare /      In time: 7481 Out time: 8258   Total Treatment Time: 45     Medicare/BCBS Time Tracking (below)   Total Timed Codes (min):  45 1:1 Treatment Time:  45     TREATMENT AREA =  Balance problem [R26.89]  Low back pain [M54.5]    SUBJECTIVE  Pain Level (on 0 to 10 scale): 0 (regarding back)  / 10   Medication Changes/New allergies or changes in medical history, any new surgeries or procedures?    no  If yes, update Summary List   Subjective Functional Status/Changes:  []  No changes reported   No new c/o. \"I walk a lot. \"       OBJECTIVE  15 min Therapeutic Exercise:  [x]  See flow sheet   Rationale:      increase ROM, increase strength, improve balance and increase proprioception to improve the patients ability to perform activities and decrease pain with movement. 5 min Therapeutic Activity: [x]  See flow sheet  Body part BLEs/balance   Rationale:    improve balance and safety to  improve the patients ability to Tolerate basic ADLs and job-related tasks without pain. 10 min Neuromuscular Re-ed: [x]  See flow sheet   Rationale:    improve balance and increase proprioception  to improve the patients ability to perform activities with good form and proprioception with tactile and verbal cuing appropriately in order to develop conscious control of individual muscles and awareness of position of extremities. 15 min Gait Training:  Retro amb with therapist, sidestepping, gait training with \"take big steps\" cueing   Rationale: To improve ambulation safety and efficiency in order to improve patient's ability to safely ambulate at home for self care.     Billed With/As:   [x] TE   [] TA   [] Neuro   [] Self Care Patient Education: [x] Review HEP    [] Progressed/Changed HEP based on:   [x] positioning   [x] body mechanics   [x] transfers   [] heat/ice application    [] other:      Other Objective/Functional Measures: Added retro amb, Romberg EO/EC on/off foam  Seated Frenkels heel barroso, and added alternating heel/toe taps in sitting for LE coordination     Post Treatment Pain Level (on 0 to 10) scale:   0  / 10     ASSESSMENT  Assessment/Changes in Function:   Pt continues to have dyskinetic and apraxic movements requiring CGA throughout session to prevent LOB. []  See Progress Note/Recertification   Patient will continue to benefit from skilled PT services to modify and progress therapeutic interventions to attain remaining goals. Progress toward goals / Updated goals: · Short Term Goals: To be accomplished in  4  treatments:  Pt will be I with HEP in order to improve functional ADL's  Pt will report max pain <5/10 in order to improve functional ADL's-progressing 0/10 VAS 7/6/21     · Long Term Goals: To be accomplished in  8  treatments:  Pt will be I with advanced HEP in order to self manage symptoms following DC from therapy. Pt will indicate +5 on the GROC in order to demonstrate significant improvement in therapy.   Pt will be able to perform ambulation with normalized gait pattern for 500 feet in order to demonstrate safe community ambulation Progressing requires VCs for increased step length and balance 7/13/21        PLAN  [x]  Upgrade activities as tolerated yes Continue plan of care   []  Discharge due to :    []  Other:      Therapist: Rebecca Perez, PT    Date: 7/13/2021 Time: 7:51 AM     Future Appointments   Date Time Provider Luma Monsivais   7/13/2021 10:30 AM 1000 [facility] [address] [place] [facility]   7/15/2021 10:45 AM Alvaro Panda [address]   7/20/2021 11:15 AM Hans Panda, PT [address]   7/22/2021 11:30 AM Hans Panda, PT [address]   7/27/2021 11:15 AM Hans Panda, PT [facility]   7/29/2021 11:30 AM Farzad Lucy Sy,  [facility]

## 2021-07-15 ENCOUNTER — HOSPITAL ENCOUNTER (OUTPATIENT)
Dept: PHYSICAL THERAPY | Age: 66
End: 2021-07-15
Attending: LEGAL MEDICINE

## 2021-07-20 ENCOUNTER — HOSPITAL ENCOUNTER (OUTPATIENT)
Dept: PHYSICAL THERAPY | Age: 66
Discharge: HOME | End: 2021-07-20
Attending: LEGAL MEDICINE

## 2021-07-20 PROCEDURE — 97116 GAIT TRAINING THERAPY: CPT

## 2021-07-20 PROCEDURE — 97112 NEUROMUSCULAR REEDUCATION: CPT

## 2021-07-20 PROCEDURE — 97110 THERAPEUTIC EXERCISES: CPT

## 2021-07-20 NOTE — PROGRESS NOTES
PHYSICAL THERAPY - DAILY TREATMENT NOTE    Patient Name: Ra Duron        Date: 2021  : 1955   yes Patient  Verified  Visit #:   7  of   8  Insurance: Payor: Adela Bazan / Plan: [place] MED[place]ARE PART A & B / Product Type: Medicare /      In time: 5558 Out time: 1200   Total Treatment Time: 45     Medicare/BCBS Time Tracking (below)   Total Timed Codes (min):  45 1:1 Treatment Time:  45     TREATMENT AREA =  Balance problem [R26.89]  Low back pain [M54.5]    SUBJECTIVE  Pain Level (on 0 to 10 scale): 0 (regarding back)  / 10   Medication Changes/New allergies or changes in medical history, any new surgeries or procedures?    no  If yes, update Summary List   Subjective Functional Status/Changes:  []  No changes reported     Pt denies any pain today upon starting therapy and reports \"im fine\" when asked any new changes since her last session. OBJECTIVE  15 min Therapeutic Exercise:  [x]  See flow sheet   Rationale:      increase ROM, increase strength, improve balance and increase proprioception to improve the patients ability to perform activities and decrease pain with movement. 15 min Neuromuscular Re-ed: [x]  See flow sheet   Rationale:    improve balance and increase proprioception  to improve the patients ability to perform activities with good form and proprioception with tactile and verbal cuing appropriately in order to develop conscious control of individual muscles and awareness of position of extremities. 15 min Gait Training:  Retro amb with therapist, sidestepping, gait training with \"take big steps\" cueing   Rationale: To improve ambulation safety and efficiency in order to improve patient's ability to safely ambulate at home for self care.     Billed With/As:   [x] TE   [] TA   [] Neuro   [] Self Care Patient Education: [x] Review HEP    [] Progressed/Changed HEP based on:   [x] positioning   [x] body mechanics   [x] transfers   [] heat/ice application    [] other: Other Objective/Functional Measures:    See therex per flow sheet  Pt denies any sharp pains or red flags following tx    Supervision and CGA for all standing TE due to instability to walking and leg movements, particularly with sidestepping and step ups onto the box. Post Treatment Pain Level (on 0 to 10) scale:   0  / 10     ASSESSMENT  Assessment/Changes in Function:     PN NV for 30 day reassessment. []  See Progress Note/Recertification   Patient will continue to benefit from skilled PT services to modify and progress therapeutic interventions to attain remaining goals. Progress toward goals / Updated goals: · Short Term Goals: To be accomplished in  4  treatments:  Pt will be I with HEP in order to improve functional ADL's  Pt will report max pain <5/10 in order to improve functional ADL's-progressing 0/10 VAS 7/6/21     · Long Term Goals: To be accomplished in  8  treatments:  Pt will be I with advanced HEP in order to self manage symptoms following DC from therapy. Pt will indicate +5 on the GROC in order to demonstrate significant improvement in therapy.   Pt will be able to perform ambulation with normalized gait pattern for 500 feet in order to demonstrate safe community ambulation Progressing requires VCs for increased step length and balance 7/13/21    PN NV        PLAN  [x]  Upgrade activities as tolerated yes Continue plan of care   []  Discharge due to :    []  Other:      Therapist: Sonido Thompson PT    Date: 7/20/2021 Time: 7:51 AM     Future Appointments   Date Time Provider Luma Monsivais   7/20/2021 11:15 AM Tal Panda [id number]   7/22/2021 11:30 AM Tal Panda [id number]   7/27/2021 11:15 AM Ras Panda, PT [facility]   7/29/2021 11:30 AM Ras Panda, ZEE Jacobs 3914

## 2021-07-22 ENCOUNTER — APPOINTMENT (OUTPATIENT)
Dept: PHYSICAL THERAPY | Age: 66
End: 2021-07-22
Attending: LEGAL MEDICINE

## 2021-07-27 ENCOUNTER — HOSPITAL ENCOUNTER (OUTPATIENT)
Dept: PHYSICAL THERAPY | Age: 66
End: 2021-07-27
Attending: LEGAL MEDICINE

## 2021-08-12 ENCOUNTER — TELEPHONE (OUTPATIENT)
Dept: FAMILY MEDICINE CLINIC | Age: 66
End: 2021-08-12

## 2021-08-12 NOTE — TELEPHONE ENCOUNTER
Danica Atkinson(daughter in law) and Sharita Guerrero( son) called on behalf of the pt. They stated that Dr Ileana Duncan wrote a letter back on 5/20/2019 in regards [place]. They would like to have that letter updated with the current date and would like to be able to  when it is available. They are/were trying to get pt's meds for her but can not unless they have the letter. If any questions you can contact her son Simon Cerda at [phone]. Both are listed on PHI and they also stated that they are both her Durable POA and will bring in forms to have them on file.  Please advise

## 2021-08-12 NOTE — LETTER
NOTIFICATION RETURN TO WORK / SCHOOL    8/12/2021 1:00 PM    Ms. Charlie Vogel  [address]      To Whom It May Concern:    Charlie Vogel is currently under the care of [address] [place] [address].      Ms. 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 As a result, she has difficulty walking and cannot shop or  prescriptions on her own and needs an authorized assistant (her son, Camila Doctor) [place]       If there are questions or concerns please have the patient contact our office.         Sincerely,      Camille Gamino MD

## 2021-09-02 DIAGNOSIS — K21.9 GASTROESOPHAGEAL REFLUX DISEASE WITHOUT ESOPHAGITIS: ICD-10-CM

## 2021-09-02 DIAGNOSIS — E78.00 HYPERCHOLESTEREMIA: ICD-10-CM

## 2021-09-02 DIAGNOSIS — I10 ESSENTIAL HYPERTENSION: ICD-10-CM

## 2021-09-02 DIAGNOSIS — J30.9 ALLERGIC RHINITIS, UNSPECIFIED SEASONALITY, UNSPECIFIED TRIGGER: ICD-10-CM

## 2021-09-02 DIAGNOSIS — H81.11 BPPV (BENIGN PAROXYSMAL POSITIONAL VERTIGO), RIGHT: ICD-10-CM

## 2021-09-02 NOTE — TELEPHONE ENCOUNTER
Last seen 6/4/21    Last filled 6/4/21 qty 90 w/ 0 refills    Future Appointments   Date Time Provider Luma Yodit   9/14/2021  1:45 PM Cammy Topete MD [facility] BS [facility]B

## 2021-09-02 NOTE — TELEPHONE ENCOUNTER
Requested Prescriptions     Pending Prescriptions Disp Refills    meclizine (ANTIVERT) 25 mg tablet 180 Tablet 0     Sig: Take 1 Tablet by mouth two (2) times daily as needed for Dizziness. Indications: sensation of spinning or whirling    Omeprazole delayed release (PRILOSEC D/R) 20 mg tablet 90 Tablet 0     Sig: Take 1 Tablet by mouth daily.  cetirizine (ZYRTEC) 5 mg tablet 90 Tablet 0     Sig: Take 1 Tablet by mouth daily as needed for Allergies. Indications: inflammation of the nose due to an allergy    simvastatin (ZOCOR) 20 mg tablet 90 Tablet 0     Sig: TAKE 1 TABLET BY MOUTH NIGHTLY AT BEDTIME  Indications: high cholesterol    losartan (COZAAR) 50 mg tablet 90 Tablet 1     Sig: Take 1 Tablet by mouth daily for 90 days. Pt had to r/s her upcoming appt to further out due to her being exposed to covid through a family member. She is requesting the medication be sent in for her now however. Please advise.      Future Appointments   Date Time Provider Luma Monsivais   9/14/2021  1:45 PM MD YVONNE Mccallum BS AMB

## 2021-09-04 RX ORDER — MECLIZINE HYDROCHLORIDE 25 MG/1
25 TABLET ORAL
Qty: 180 TABLET | Refills: 0 | Status: SHIPPED
Start: 2021-09-04 | End: 2021-12-10

## 2021-09-04 RX ORDER — PHENOL/SODIUM PHENOLATE
20 AEROSOL, SPRAY (ML) MUCOUS MEMBRANE DAILY
Qty: 90 TABLET | Refills: 1 | Status: SHIPPED
Start: 2021-09-04 | End: 2021-12-10

## 2021-09-04 RX ORDER — LOSARTAN POTASSIUM 50 MG/1
50 TABLET ORAL DAILY
Qty: 90 TABLET | Refills: 1 | Status: SHIPPED | OUTPATIENT
Start: 2021-09-04 | End: 2021-12-10

## 2021-09-04 RX ORDER — SIMVASTATIN 20 MG/1
TABLET, FILM COATED ORAL
Qty: 90 TABLET | Refills: 1 | Status: SHIPPED
Start: 2021-09-04 | End: 2021-12-10

## 2021-09-04 RX ORDER — CETIRIZINE HYDROCHLORIDE 5 MG/1
5 TABLET ORAL
Qty: 90 TABLET | Refills: 1 | Status: SHIPPED
Start: 2021-09-04 | End: 2021-12-10

## 2021-09-07 NOTE — PROGRESS NOTES
[facility] PHYSICAL THERAPY  [address] [facility] [address],[facility], [address] [facility] - Phone: [phone]  Fax: [phone]  DISCHARGE SUMMARY FOR PHYSICAL THERAPY          Patient Name: Salena Renteria : 1955   Treatment/Medical Diagnosis: Balance problem [R26.89]  Low back pain [M54.5]   Onset Date: chronic    Referral Source: Marah Smith MD [facility]): 2021   Prior Hospitalization: See Medical History Provider #: [id number]   Prior Level of Function: Chronic L/S Pain,    Comorbidities: Memory Loss, HBP, Visually Impaired, Asthma   Medications: Verified on Patient Summary List   Visits from [facility]: 7 Missed Visits: CX:2  NS:1     Current Status:  Pt was seen on 2021 for an IE for Balance Problems and Low Back Pain. Pt was seen for 6 follow up visits and did not return to PT follow their last visit on 2021. Pt will be DC from PT at this time. Unable to formally reassess progress towards goals or outcome measures secondary to patient not returning to therapy. · Short Term Goals: To be accomplished in  4  treatments:  Pt will be I with HEP in order to improve functional ADL's- goal met per pt report  Pt will report max pain <5/10 in order to improve functional ADL's- goal met, pt denied pain at any visit following her IE        · Long Term Goals: To be accomplished in  8  treatments:  Pt will be I with advanced HEP in order to self manage symptoms following DC from therapy. - unable due to pt not returning  Pt will indicate +5 on the [facility]FORT MICHAEL in order to demonstrate significant improvement in therapy. - unable to assess due to patient not returning  Pt will be able to perform ambulation with normalized gait pattern for 500 feet in order to demonstrate safe community ambulation- unable to assess due to patient not returning, however, pt verbalized walking in the court at her house several times per day      Assessments/Recommendations:  Other: Pt did not return to therapy in greater then 30 days from last visit and will be DC at this time. If you have any questions/comments please contact us directly at [phone]. Thank you for allowing us to assist in the care of your patient.     Therapist Signature: Nba Samuel PT Date: 9/7/2021   Reporting Period: 6/22/2021-7/20/2021 Time: 10:30 AM

## 2021-09-14 ENCOUNTER — VIRTUAL VISIT (OUTPATIENT)
Dept: FAMILY MEDICINE CLINIC | Age: 66
End: 2021-09-14
Payer: MEDICARE

## 2021-09-14 DIAGNOSIS — E55.9 VITAMIN D DEFICIENCY: Primary | ICD-10-CM

## 2021-09-14 DIAGNOSIS — R73.03 PREDIABETES: ICD-10-CM

## 2021-09-14 DIAGNOSIS — R26.9 ABNORMAL GAIT: ICD-10-CM

## 2021-09-14 DIAGNOSIS — E78.00 HYPERCHOLESTEREMIA: ICD-10-CM

## 2021-09-14 DIAGNOSIS — I10 ESSENTIAL HYPERTENSION: ICD-10-CM

## 2021-09-14 PROCEDURE — 99212 OFFICE O/P EST SF 10 MIN: CPT | Performed by: LEGAL MEDICINE

## 2021-09-14 PROCEDURE — 1101F PT FALLS ASSESS-DOCD LE1/YR: CPT | Performed by: LEGAL MEDICINE

## 2021-09-14 PROCEDURE — G8400 PT W/DXA NO RESULTS DOC: HCPCS | Performed by: LEGAL MEDICINE

## 2021-09-14 PROCEDURE — 3017F COLORECTAL CA SCREEN DOC REV: CPT | Performed by: LEGAL MEDICINE

## 2021-09-14 PROCEDURE — 1090F PRES/ABSN URINE INCON ASSESS: CPT | Performed by: LEGAL MEDICINE

## 2021-09-14 PROCEDURE — G8510 SCR DEP NEG, NO PLAN REQD: HCPCS | Performed by: LEGAL MEDICINE

## 2021-09-14 PROCEDURE — G8427 DOCREV CUR MEDS BY ELIG CLIN: HCPCS | Performed by: LEGAL MEDICINE

## 2021-09-14 PROCEDURE — G8756 NO BP MEASURE DOC: HCPCS | Performed by: LEGAL MEDICINE

## 2021-09-14 NOTE — PROGRESS NOTES
Shyla Galeana is a 77 y.o. female (: 1955) presenting to address:    Chief Complaint   Patient presents with    Medication Evaluation       There were no vitals filed for this visit. Hearing/Vision:   No exam data present    Learning Assessment:     Learning Assessment 10/15/2015   PRIMARY LEARNER Patient   HIGHEST LEVEL OF EDUCATION - PRIMARY LEARNER  GRADUATED HIGH SCHOOL OR GED   BARRIERS PRIMARY LEARNER LANGUAGE   CO-LEARNER CAREGIVER Yes   CO-LEARNER NAME Azra   CO-LEARNER HIGHEST LEVEL OF EDUCATION GRADUATED HIGH SCHOOL OR GED   BARRIERS CO-LEARNER NONE   PRIMARY LANGUAGE ENGLISH   PRIMARY LANGUAGE CO-LEARNER ENGLISH    NEED No   LEARNER PREFERENCE PRIMARY LISTENING   LEARNER PREFERENCE CO-LEARNER LISTENING   LEARNING SPECIAL TOPICS no   ANSWERED BY self   RELATIONSHIP SELF     Depression Screening:     3 most recent PHQ Screens 2021   Little interest or pleasure in doing things Not at all   Feeling down, depressed, irritable, or hopeless Not at all   Total Score PHQ 2 0     Fall Risk Assessment:     Fall Risk Assessment, last 12 mths 2021   Able to walk? Yes   Fall in past 12 months? 0   Do you feel unsteady? 0   Are you worried about falling 0     Abuse Screening:     Abuse Screening Questionnaire 10/15/2015   Do you ever feel afraid of your partner? N   Are you in a relationship with someone who physically or mentally threatens you? N   Is it safe for you to go home? Y     Coordination of Care Questionaire:   1. Have you been to the ER, urgent care clinic since your last visit? Hospitalized since your last visit? NO    2. Have you seen or consulted any other health care providers outside of the [address] [place] [address] since your last visit? Include any pap smears or colon screening. YES, PT but was exposed to [employer] and will resume eventually. Advanced Directive:   1. Do you have an Advanced Directive? NO    2. Would you like information on Advanced Directives? NO    Has had the COVID vaccine in [place]. Needs the flu vaccine.

## 2021-09-14 NOTE — PROGRESS NOTES
Ra Duron is a 77 y.o. female, evaluated via audio-only technology on 9/14/2021 for Medication Evaluation  . blood pressure readings 126/85  Patient is here for follow-up she could not come in the office because he has been exposed to COVID-19 and they are waiting for mammogram test results    Patient does not have any complaint no cough no shortness of breath no fever no body aches    She been doing well and stable on current medications    Medication refills has been recently done    She has been physical therapy for balance    Assessment & Plan:   Diagnoses and all orders for this visit:    1. Vitamin D deficiency  She is on vitamin D supplements  2. Hypercholesteremia  Patient is adherent to statin  3. Essential hypertension  Blood pressures well controlled  4. Abnormal gait  Patient has underwent physical therapy with some improvement  5. Prediabetes  Only lifestyle modifications      12  Subjective:       Prior to Admission medications    Medication Sig Start Date End Date Taking? Authorizing Provider   meclizine (ANTIVERT) 25 mg tablet Take 1 Tablet by mouth two (2) times daily as needed for Dizziness. Indications: sensation of spinning or whirling 9/4/21  Yes Enmanuel Pack MD   Omeprazole delayed release (PRILOSEC D/R) 20 mg tablet Take 1 Tablet by mouth daily. 9/4/21  Yes Enmanuel Pack MD   cetirizine (ZYRTEC) 5 mg tablet Take 1 Tablet by mouth daily as needed for Allergies. Indications: inflammation of the nose due to an allergy 9/4/21  Yes Enmanuel Pack MD   simvastatin (ZOCOR) 20 mg tablet TAKE 1 TABLET BY MOUTH NIGHTLY AT BEDTIME  Indications: high cholesterol 9/4/21  Yes Enmanuel Pack MD   losartan (COZAAR) 50 mg tablet Take 1 Tablet by mouth daily for 90 days. 9/4/21 12/3/21 Yes Enmanuel Pack MD   albuterol (PROVENTIL HFA, VENTOLIN HFA, PROAIR HFA) 90 mcg/actuation inhaler Take 2 Puffs by inhalation every six (6) hours as needed for Wheezing.  6/4/21  Yes Saud Jeffers Renetta Melgar MD   omega 6-wlg-yjp-fish oil (Fish Oil) 100-160-1,000 mg cap Take  by mouth. Yes Provider, Historical   cholecalciferol (VITAMIN D3) (2,000 UNITS /50 MCG) cap capsule Take 1 Capsule by mouth daily for 90 days. 5/27/21 9/14/21 Yes Mary Carlton MD   garlic 6,410 mg cap One daily 5/27/21  Yes Mary Carlton MD   multivit/folic acid/vit K1 (ONE-A-DAY WOMEN'S 50 PLUS PO) Take  by mouth daily. Yes Provider, Historical     Patient Active Problem List   Diagnosis Code    Hypercholesteremia E78.00    Vitamin D deficiency E55.9    HTN (hypertension) I10     Patient Active Problem List    Diagnosis Date Noted    Hypercholesteremia 10/28/2015    Vitamin D deficiency 10/18/2011    HTN (hypertension) 10/17/2011     Current Outpatient Medications   Medication Sig Dispense Refill    meclizine (ANTIVERT) 25 mg tablet Take 1 Tablet by mouth two (2) times daily as needed for Dizziness. Indications: sensation of spinning or whirling 180 Tablet 0    Omeprazole delayed release (PRILOSEC D/R) 20 mg tablet Take 1 Tablet by mouth daily. 90 Tablet 1    cetirizine (ZYRTEC) 5 mg tablet Take 1 Tablet by mouth daily as needed for Allergies. Indications: inflammation of the nose due to an allergy 90 Tablet 1    simvastatin (ZOCOR) 20 mg tablet TAKE 1 TABLET BY MOUTH NIGHTLY AT BEDTIME  Indications: high cholesterol 90 Tablet 1    losartan (COZAAR) 50 mg tablet Take 1 Tablet by mouth daily for 90 days. 90 Tablet 1    albuterol (PROVENTIL HFA, VENTOLIN HFA, PROAIR HFA) 90 mcg/actuation inhaler Take 2 Puffs by inhalation every six (6) hours as needed for Wheezing. 3 Inhaler 0    omega 3-dha-epa-fish oil (Fish Oil) 100-160-1,000 mg cap Take  by mouth.  cholecalciferol (VITAMIN D3) (2,000 UNITS /50 MCG) cap capsule Take 1 Capsule by mouth daily for 90 days. 90 Capsule 1    garlic 3,474 mg cap One daily 90 Capsule 0    multivit/folic acid/vit K1 (ONE-A-DAY WOMEN'S 50 PLUS PO) Take  by mouth daily. Allergies   Allergen Reactions    Flexeril [Cyclobenzaprine] Swelling     Leg swelling    Kiwi Swelling     Throat swelling     No past medical history on file. Past Surgical History:   Procedure Laterality Date    HX TUBAL LIGATION       Family History   Problem Relation Age of Onset    Heart Disease Brother     Heart Disease Sister      Social History     Tobacco Use    Smoking status: Former Smoker     Packs/day: 0.25     Years: 45.00     Pack years: 11.25     Quit date: 9/15/2015     Years since quittin.0    Smokeless tobacco: Current User    Tobacco comment: vape   Substance Use Topics    Alcohol use: No     Alcohol/week: 0.0 standard drinks       Review of Systems   Constitutional: Negative for chills, fever, malaise/fatigue and weight loss. HENT: Negative for congestion, ear discharge, ear pain, hearing loss, nosebleeds, sinus pain and sore throat. Eyes: Negative for blurred vision, double vision and discharge. Respiratory: Negative for cough, hemoptysis, sputum production, shortness of breath and wheezing. Cardiovascular: Negative for chest pain, palpitations, claudication and leg swelling. Gastrointestinal: Negative for abdominal pain, constipation, diarrhea, nausea and vomiting. Genitourinary: Negative for dysuria, flank pain, frequency, hematuria and urgency. Musculoskeletal: Positive for falls. Negative for back pain, joint pain, myalgias and neck pain. Skin: Negative for itching and rash. Neurological: Negative for dizziness, tingling, sensory change, speech change, focal weakness, weakness and headaches. Psychiatric/Behavioral: Positive for memory loss. Negative for depression, hallucinations, substance abuse and suicidal ideas. The patient is nervous/anxious. The patient does not have insomnia.         Patient-Reported Vitals 2021   Patient-Reported Temperature 98.6       Oralia Lea, who was evaluated through a patient-initiated, synchronous (real-time) audio only encounter, and/or her healthcare decision maker, is aware that it is a billable service, with coverage as determined by her insurance carrier. She provided verbal consent to proceed: Yes. She has not had a related appointment within my department in the past 7 days or scheduled within the next 24 hours. On this date 09/14/2021 I have spent 15 minutes reviewing previous notes, test results and face to face (virtual) with the patient discussing the diagnosis and importance of compliance with the treatment plan as well as documenting on the day of the visit.     Curly Camejo MD

## 2021-12-10 ENCOUNTER — OFFICE VISIT (OUTPATIENT)
Dept: FAMILY MEDICINE CLINIC | Age: 66
End: 2021-12-10
Payer: MEDICARE

## 2021-12-10 VITALS
SYSTOLIC BLOOD PRESSURE: 150 MMHG | WEIGHT: 103 LBS | BODY MASS INDEX: 22.22 KG/M2 | RESPIRATION RATE: 16 BRPM | OXYGEN SATURATION: 100 % | TEMPERATURE: 97 F | HEIGHT: 57 IN | DIASTOLIC BLOOD PRESSURE: 74 MMHG | HEART RATE: 67 BPM

## 2021-12-10 DIAGNOSIS — R55 SYNCOPE, UNSPECIFIED SYNCOPE TYPE: Primary | ICD-10-CM

## 2021-12-10 DIAGNOSIS — E78.00 HYPERCHOLESTEREMIA: ICD-10-CM

## 2021-12-10 DIAGNOSIS — G45.9 TIA (TRANSIENT ISCHEMIC ATTACK): ICD-10-CM

## 2021-12-10 DIAGNOSIS — Z12.31 BREAST CANCER SCREENING BY MAMMOGRAM: ICD-10-CM

## 2021-12-10 DIAGNOSIS — E55.9 VITAMIN D DEFICIENCY: ICD-10-CM

## 2021-12-10 DIAGNOSIS — Z78.0 MENOPAUSE: ICD-10-CM

## 2021-12-10 DIAGNOSIS — I10 ESSENTIAL HYPERTENSION: ICD-10-CM

## 2021-12-10 PROCEDURE — G8400 PT W/DXA NO RESULTS DOC: HCPCS | Performed by: LEGAL MEDICINE

## 2021-12-10 PROCEDURE — G8754 DIAS BP LESS 90: HCPCS | Performed by: LEGAL MEDICINE

## 2021-12-10 PROCEDURE — G8753 SYS BP > OR = 140: HCPCS | Performed by: LEGAL MEDICINE

## 2021-12-10 PROCEDURE — 1090F PRES/ABSN URINE INCON ASSESS: CPT | Performed by: LEGAL MEDICINE

## 2021-12-10 PROCEDURE — G8427 DOCREV CUR MEDS BY ELIG CLIN: HCPCS | Performed by: LEGAL MEDICINE

## 2021-12-10 PROCEDURE — 99214 OFFICE O/P EST MOD 30 MIN: CPT | Performed by: LEGAL MEDICINE

## 2021-12-10 PROCEDURE — G9899 SCRN MAM PERF RSLTS DOC: HCPCS | Performed by: LEGAL MEDICINE

## 2021-12-10 PROCEDURE — G8432 DEP SCR NOT DOC, RNG: HCPCS | Performed by: LEGAL MEDICINE

## 2021-12-10 PROCEDURE — 3017F COLORECTAL CA SCREEN DOC REV: CPT | Performed by: LEGAL MEDICINE

## 2021-12-10 PROCEDURE — G8420 CALC BMI NORM PARAMETERS: HCPCS | Performed by: LEGAL MEDICINE

## 2021-12-10 PROCEDURE — G8536 NO DOC ELDER MAL SCRN: HCPCS | Performed by: LEGAL MEDICINE

## 2021-12-10 PROCEDURE — 1101F PT FALLS ASSESS-DOCD LE1/YR: CPT | Performed by: LEGAL MEDICINE

## 2021-12-10 RX ORDER — GUAIFENESIN 100 MG/5ML
81 LIQUID (ML) ORAL DAILY
COMMUNITY
Start: 2021-12-01 | End: 2021-12-10 | Stop reason: SDUPTHER

## 2021-12-10 RX ORDER — ATORVASTATIN CALCIUM 40 MG/1
40 TABLET, FILM COATED ORAL
COMMUNITY
Start: 2021-11-30 | End: 2021-12-10 | Stop reason: SDUPTHER

## 2021-12-10 RX ORDER — ATORVASTATIN CALCIUM 40 MG/1
40 TABLET, FILM COATED ORAL
Qty: 90 TABLET | Refills: 3 | Status: SHIPPED | OUTPATIENT
Start: 2021-12-10 | End: 2022-03-23 | Stop reason: SDUPTHER

## 2021-12-10 RX ORDER — GUAIFENESIN 100 MG/5ML
81 LIQUID (ML) ORAL DAILY
Qty: 90 TABLET | Refills: 3 | Status: SHIPPED | OUTPATIENT
Start: 2021-12-10 | End: 2022-03-23 | Stop reason: SDUPTHER

## 2021-12-10 RX ORDER — LOSARTAN POTASSIUM 50 MG/1
50 TABLET ORAL DAILY
Qty: 90 TABLET | Refills: 1 | Status: SHIPPED | OUTPATIENT
Start: 2021-12-10 | End: 2022-03-23 | Stop reason: SDUPTHER

## 2021-12-10 NOTE — PROGRESS NOTES
Jack Dang     Chief Complaint   Patient presents with   [facility] Follow Up     hospital discon medication     Stroke     mini stroke      Vitals:    12/10/21 1000 12/10/21 1008   BP: (!) 171/70 (!) 150/74   Pulse: 67    Resp: 16    Temp: 97 °F (36.1 °C)    TempSrc: Temporal    SpO2: 100%    Weight: 103 lb (46.7 kg)    Height: 4' 9\" (1.448 m)    PainSc:   0 - No pain          HPI: Ibrahima Betancur is here with her daugter in law   For follow-up after hospital admission     daughter frank gillng that her actual age is 67 and her date of birth  Was documented chronically in the [facility]        this is a summary of the hospital discharge  Admit Date: 11/25/2021 D/C Date: 11/30/2021 Patient Class: Observation/Short Stay     Primary Discharge Diagnosis:   Syncope     Code Status at Time of Discharge: Full Code        Discharge Medications:  Current Discharge Medication List     START taking these medications   Details   aspirin 81 mg PO CHEW Take 1 Tab by Mouth Once a Day. Qty: 30 Tab, Refills: 0     atorvastatin (LIPITOR) 40 mg PO TABS Take 1 Tab by Mouth Every Night at Bedtime. Qty: 30 Tab, Refills: 0       CONTINUE these medications which have NOT CHANGED   Details   albuterol (PROVENTIL, VENTOLIN) 90 mcg/actuation INH HFAA Take 1-2 Puffs inhaled by mouth Every 4 Hours As Needed for Wheezing (wheeze). Qty: 1 Inhaler, Refills: 0     amLODIPine (NORVASC) 10 mg PO TABS Take 10 mg by Mouth Once a Day. loratadine (CLARITIN) 10 mg PO TABS Take 1 Tab by Mouth Once a Day. Qty: 28 Tab, Refills: 0     meclizine (ANTIVERT) 25 mg PO TABS Take 1 Tab by Mouth 3 Times Daily As Needed.   Qty: 30 Tab, Refills: 0       STOP taking these medications     albuterol (PROVENTIL, VENTOLIN) 2.5 mg /3 mL (0.083 %) INH NEBU Comments:   Reason for Stopping:     losartan (COZAAR) 50 mg PO TABS Comments:   Reason for Stopping:     omeprazole (PRILOSEC) 20 mg PO CPDR Comments:   Reason for Stopping:     simvastatin (ZOCOR) 20 mg PO TABS Comments:   Reason for Stopping:        Hospital records and note has been reviewed and            No past medical history on file. Past Surgical History:   Procedure Laterality Date    HX TUBAL LIGATION       Social History     Tobacco Use    Smoking status: Former Smoker     Packs/day: 0.25     Years: 45.00     Pack years: 11.25     Types: Cigarettes     Quit date: 2021     Years since quittin.0    Smokeless tobacco: Current User    Tobacco comment: vape   Substance Use Topics    Alcohol use: No     Alcohol/week: 0.0 standard drinks       Family History   Problem Relation Age of Onset    Heart Disease Brother     Heart Disease Sister        Review of Systems   Constitutional: Negative for chills, fever, malaise/fatigue and weight loss. HENT: Negative for congestion, ear discharge, ear pain, hearing loss, nosebleeds, sinus pain and sore throat. Eyes: Negative for blurred vision, double vision and discharge. Respiratory: Positive for cough and shortness of breath. Negative for hemoptysis, sputum production and wheezing. Cardiovascular: Negative for chest pain, palpitations, claudication and leg swelling. Gastrointestinal: Positive for constipation. Negative for abdominal pain, blood in stool, diarrhea, heartburn, melena, nausea and vomiting. Genitourinary: Negative for dysuria, frequency and urgency. Musculoskeletal: Negative for back pain, falls, joint pain, myalgias and neck pain. Skin: Negative for itching and rash. Neurological: Negative for dizziness, tingling, tremors, sensory change, speech change, focal weakness, weakness and headaches. Psychiatric/Behavioral: Positive for depression. Negative for hallucinations, substance abuse and suicidal ideas. The patient is nervous/anxious. The patient does not have insomnia. Physical Exam  Vitals and nursing note reviewed. Constitutional:       General: She is not in acute distress.      Appearance: She is well-developed. She is not diaphoretic. HENT:      Head: Normocephalic and atraumatic. Eyes:      General: No scleral icterus. Right eye: No discharge. Left eye: No discharge. Neck:      Thyroid: No thyromegaly. Cardiovascular:      Rate and Rhythm: Normal rate and regular rhythm. Heart sounds: Normal heart sounds. No murmur heard. Pulmonary:      Effort: Pulmonary effort is normal. No respiratory distress. Breath sounds: Normal breath sounds. No stridor. No wheezing, rhonchi or rales. Chest:      Chest wall: No tenderness. Abdominal:      General: There is no distension. Palpations: Abdomen is soft. Tenderness: There is no abdominal tenderness. There is no right CVA tenderness, left CVA tenderness, guarding or rebound. Musculoskeletal:         General: No tenderness or deformity. Normal range of motion. Right lower leg: No edema. Left lower leg: No edema. Lymphadenopathy:      Cervical: No cervical adenopathy. Skin:     General: Skin is warm and dry. Coloration: Skin is not jaundiced or pale. Findings: No bruising, erythema, lesion or rash. Neurological:      Mental Status: She is alert and oriented to person, place, and time. Cranial Nerves: No cranial nerve deficit. Motor: Weakness present. Coordination: Coordination normal.      Comments: She walks without cane but gait is weak     Psychiatric:         Mood and Affect: Mood normal.         Behavior: Behavior normal.         Thought Content: Thought content normal.         Judgment: Judgment normal.          Assessment and plan     Plan of care has been discussed with the patient, he agrees to the plan and verbalized understanding. All his questions were answered  More than 50% of the time spent in this visit was counseling the patient about  illness and treatment options         1.  Essential hypertension  She had stopped all medication when she was discharged at the hospital   Today blood pressure  is elevated need to resume losartan 50 mg follow up in 1 month   - losartan (COZAAR) 50 mg tablet; Take 1 Tablet by mouth daily for 90 days. Dispense: 90 Tablet; Refill: 1    2. Vitamin D deficiency  To resume vitamin d supplement     3. Hypercholesteremia  Dose has been increased to 40 mg   - atorvastatin (LIPITOR) 40 mg tablet; Take 1 Tablet by mouth nightly for 90 days. Dispense: 90 Tablet; Refill: 3    4. Syncope, unspecified syncope type    Possible TIA   She has positive bubble test  For cardiology evaluation     - aspirin 81 mg chewable tablet; Take 1 Tablet by mouth daily for 90 days. Dispense: 90 Tablet; Refill: 3  - REFERRAL TO CARDIOLOGY    5. TIA (transient ischemic attack)    - atorvastatin (LIPITOR) 40 mg tablet; Take 1 Tablet by mouth nightly for 90 days. Dispense: 90 Tablet; Refill: 3  - aspirin 81 mg chewable tablet; Take 1 Tablet by mouth daily for 90 days. Dispense: 90 Tablet; Refill: 3  - REFERRAL TO CARDIOLOGY    6. Menopause    - DEXA BONE DENSITY STUDY AXIAL; Future    7. Breast cancer screening by mammogram    - [facility] MAMMO BI SCREENING INCL CAD; Future    Current Outpatient Medications   Medication Sig Dispense Refill    losartan (COZAAR) 50 mg tablet Take 1 Tablet by mouth daily for 90 days. 90 Tablet 1    atorvastatin (LIPITOR) 40 mg tablet Take 1 Tablet by mouth nightly for 90 days. 90 Tablet 3    aspirin 81 mg chewable tablet Take 1 Tablet by mouth daily for 90 days. 90 Tablet 3    albuterol (PROVENTIL HFA, VENTOLIN HFA, PROAIR HFA) 90 mcg/actuation inhaler Take 2 Puffs by inhalation every six (6) hours as needed for Wheezing. 3 Inhaler 0    omega 3-dha-epa-fish oil (Fish Oil) 100-160-1,000 mg cap Take  by mouth. (Patient not taking: Reported on 12/10/2021)      cholecalciferol (VITAMIN D3) (2,000 UNITS /50 MCG) cap capsule Take 1 Capsule by mouth daily for 90 days.  (Patient not taking: Reported on 12/10/2021) 90 Capsule 1    garlic 8,778 mg cap One daily (Patient not taking: Reported on 12/10/2021) 90 Capsule 0    multivit/folic acid/vit K1 (ONE-A-DAY WOMEN'S 50 PLUS PO) Take  by mouth daily. (Patient not taking: Reported on 12/10/2021)         Patient Active Problem List    Diagnosis Date Noted    Hypercholesteremia 10/28/2015    Vitamin D deficiency 10/18/2011    HTN (hypertension) 10/17/2011     Results for orders placed or performed during the hospital encounter of 06/04/21   VITAMIN B12   Result Value Ref Range    Vitamin B12 1,846 (H) 211 - 911 pg/mL   CBC WITH AUTOMATED DIFF   Result Value Ref Range    WBC 4.8 4.6 - 13.2 K/uL    RBC 4.03 (L) 4.20 - 5.30 M/uL    HGB 12.0 12.0 - 16.0 g/dL    HCT 37.5 35.0 - 45.0 %    MCV 93.1 74.0 - 97.0 FL    MCH 29.8 24.0 - 34.0 PG    MCHC 32.0 31.0 - 37.0 g/dL    RDW 13.2 11.6 - 14.5 %    PLATELET 902 021 - 463 K/uL    MPV 10.9 9.2 - 11.8 FL    NEUTROPHILS 49 40 - 73 %    LYMPHOCYTES 38 21 - 52 %    MONOCYTES 10 3 - 10 %    EOSINOPHILS 3 0 - 5 %    BASOPHILS 1 0 - 2 %    ABS. NEUTROPHILS 2.4 1.8 - 8.0 K/UL    ABS. LYMPHOCYTES 1.8 0.9 - 3.6 K/UL    ABS. MONOCYTES 0.5 0.05 - 1.2 K/UL    ABS. EOSINOPHILS 0.1 0.0 - 0.4 K/UL    ABS.  BASOPHILS 0.0 0.0 - 0.1 K/UL    DF AUTOMATED     LIPID PANEL   Result Value Ref Range    LIPID PROFILE          Cholesterol, total 232 (H) <200 MG/DL    Triglyceride 206 (H) <150 MG/DL    HDL Cholesterol 96 (H) 40 - 60 MG/DL    LDL, calculated 94.8 0 - 100 MG/DL    VLDL, calculated 41.2 MG/DL    CHOL/HDL Ratio 2.4 0 - 5.0     METABOLIC PANEL, COMPREHENSIVE   Result Value Ref Range    Sodium 142 136 - 145 mmol/L    Potassium 3.8 3.5 - 5.5 mmol/L    Chloride 111 100 - 111 mmol/L    CO2 24 21 - 32 mmol/L    Anion gap 7 3.0 - 18 mmol/L    Glucose 97 74 - 99 mg/dL    BUN 18 7.0 - 18 MG/DL    Creatinine 1.22 0.6 - 1.3 MG/DL    BUN/Creatinine ratio 15 12 - 20      GFR est AA 53 (L) >60 ml/min/1.73m2    GFR est non-AA 44 (L) >60 ml/min/1.73m2    Calcium 8.5 8.5 - 10.1 MG/DL    Bilirubin, total 0.8 0.2 - 1.0 MG/DL    ALT (SGPT) 44 13 - 56 U/L    AST (SGOT) 35 10 - 38 U/L    Alk. phosphatase 72 45 - 117 U/L    Protein, total 7.5 6.4 - 8.2 g/dL    Albumin 4.2 3.4 - 5.0 g/dL    Globulin 3.3 2.0 - 4.0 g/dL    A-G Ratio 1.3 0.8 - 1.7     HEMOGLOBIN A1C WITH EAG   Result Value Ref Range    Hemoglobin A1c 5.9 (H) 4.2 - 5.6 %    Est. average glucose 123 mg/dL   VITAMIN D, 25 HYDROXY   Result Value Ref Range    Vitamin D 25-Hydroxy 36.1 30 - 100 ng/mL     No visits with results within 3 Month(s) from this visit. Latest known visit with results is:   Hospital Outpatient Visit on 06/04/2021   Component Date Value Ref Range Status    Vitamin B12 06/04/2021 1,846* 211 - 911 pg/mL Final    WBC 06/04/2021 4.8  4.6 - 13.2 K/uL Final    RBC 06/04/2021 4.03* 4.20 - 5.30 M/uL Final    HGB 06/04/2021 12.0  12.0 - 16.0 g/dL Final    HCT 06/04/2021 37.5  35.0 - 45.0 % Final    MCV 06/04/2021 93.1  74.0 - 97.0 FL Final    MCH 06/04/2021 29.8  24.0 - 34.0 PG Final    MCHC 06/04/2021 32.0  31.0 - 37.0 g/dL Final    RDW 06/04/2021 13.2  11.6 - 14.5 % Final    PLATELET 33/94/6363 209  135 - 420 K/uL Final    MPV 06/04/2021 10.9  9.2 - 11.8 FL Final    NEUTROPHILS 06/04/2021 49  40 - 73 % Final    LYMPHOCYTES 06/04/2021 38  21 - 52 % Final    MONOCYTES 06/04/2021 10  3 - 10 % Final    EOSINOPHILS 06/04/2021 3  0 - 5 % Final    BASOPHILS 06/04/2021 1  0 - 2 % Final    ABS. NEUTROPHILS 06/04/2021 2.4  1.8 - 8.0 K/UL Final    ABS. LYMPHOCYTES 06/04/2021 1.8  0.9 - 3.6 K/UL Final    ABS. MONOCYTES 06/04/2021 0.5  0.05 - 1.2 K/UL Final    ABS. EOSINOPHILS 06/04/2021 0.1  0.0 - 0.4 K/UL Final    ABS.  BASOPHILS 06/04/2021 0.0  0.0 - 0.1 K/UL Final    DF 06/04/2021 AUTOMATED    Final    LIPID PROFILE 06/04/2021        Final    Cholesterol, total 06/04/2021 232* <200 MG/DL Final    Triglyceride 06/04/2021 206* <150 MG/DL Final    Comment: The drugs N-acetylcysteine (NAC) and  Metamiszole have been found to cause falsely  low results in this chemical assay. Please  be sure to submit blood samples obtained  BEFORE administration of either of these  drugs to assure correct results.  HDL Cholesterol 06/04/2021 96* 40 - 60 MG/DL Final    LDL, calculated 06/04/2021 94.8  0 - 100 MG/DL Final    VLDL, calculated 06/04/2021 41.2  MG/DL Final    CHOL/HDL Ratio 06/04/2021 2.4  0 - 5.0   Final    Sodium 06/04/2021 142  136 - 145 mmol/L Final    Potassium 06/04/2021 3.8  3.5 - 5.5 mmol/L Final    Chloride 06/04/2021 111  100 - 111 mmol/L Final    CO2 06/04/2021 24  21 - 32 mmol/L Final    Anion gap 06/04/2021 7  3.0 - 18 mmol/L Final    Glucose 06/04/2021 97  74 - 99 mg/dL Final    BUN 06/04/2021 18  7.0 - 18 MG/DL Final    Creatinine 06/04/2021 1.22  0.6 - 1.3 MG/DL Final    BUN/Creatinine ratio 06/04/2021 15  12 - 20   Final    GFR est AA 06/04/2021 53* >60 ml/min/1.73m2 Final    GFR est non-AA 06/04/2021 44* >60 ml/min/1.73m2 Final    Comment: (NOTE)  Estimated GFR is calculated using the Modification of Diet in Renal   Disease (MDRD) Study equation, reported for both  Americans   (GFRAA) and non- Americans (GFRNA), and normalized to 1.73m2   body surface area. The physician must decide which value applies to   the patient. The MDRD study equation should only be used in   individuals age 25 or older. It has not been validated for the   following: pregnant women, patients with serious comorbid conditions,   or on certain medications, or persons with extremes of body size,   muscle mass, or nutritional status.  Calcium 06/04/2021 8.5  8.5 - 10.1 MG/DL Final    Bilirubin, total 06/04/2021 0.8  0.2 - 1.0 MG/DL Final    ALT (SGPT) 06/04/2021 44  13 - 56 U/L Final    AST (SGOT) 06/04/2021 35  10 - 38 U/L Final    Alk.  phosphatase 06/04/2021 72  45 - 117 U/L Final    Protein, total 06/04/2021 7.5  6.4 - 8.2 g/dL Final    Albumin 06/04/2021 4.2  3.4 - 5.0 g/dL Final    Globulin 06/04/2021 3.3  2.0 - 4.0 g/dL Final    A-G Ratio 06/04/2021 1.3  0.8 - 1.7   Final    Hemoglobin A1c 06/04/2021 5.9* 4.2 - 5.6 % Final    Comment: (NOTE)  HbA1C Interpretive Ranges  <5.7              Normal  5.7 - 6.4         Consider Prediabetes  >6.5              Consider Diabetes      Est. average glucose 06/04/2021 123  mg/dL Final    Comment: (NOTE)  The eAG should be interpreted with patient characteristics in mind   since ethnicity, interindividual differences, red cell lifespan,   variation in rates of glycation, etc. may affect the validity of the   calculation.  Vitamin D 25-Hydroxy 06/04/2021 36.1  30 - 100 ng/mL Final    Comment: (NOTE)  Deficiency               <20 ng/mL  Insufficiency          20-30 ng/mL  Sufficient             ng/mL  Possible toxicity       >100 ng/mL    The Method used is Siemens Advia Centaur currently standardized to a   Center of Disease Control and Prevention (CDC) certified reference   [address] [place] [address]. Samples containing fluorescein dye can produce falsely   elevated values when tested with the ADVIA Centaur Vitamin D Assay. It is recommended that results in the toxic range, >100 ng/mL, be   retested 72 hours post fluorescein exposure. Follow-up and Dispositions    · Return in about 1 month (around 1/10/2022).

## 2021-12-10 NOTE — PROGRESS NOTES
Daija Lee is a 77 y.o. female (: 1955) presenting to address:    Chief Complaint   Patient presents with   [facility] Follow Up     hospital discon medication     Stroke     mini stroke        Vitals:    12/10/21 1000 12/10/21 1008   BP: (!) 171/70 (!) 150/74   Pulse: 67    Resp: 16    Temp: 97 °F (36.1 °C)    TempSrc: Temporal    SpO2: 100%    Weight: 103 lb (46.7 kg)    Height: 4' 9\" (1.448 m)    PainSc:   0 - No pain        Hearing/Vision:   No exam data present    Learning Assessment:     Learning Assessment 10/15/2015   PRIMARY LEARNER Patient   HIGHEST LEVEL OF EDUCATION - PRIMARY LEARNER  GRADUATED HIGH SCHOOL OR GED   BARRIERS PRIMARY LEARNER LANGUAGE   CO-LEARNER CAREGIVER Yes   CO-LEARNER NAME Azra   CO-LEARNER HIGHEST LEVEL OF EDUCATION GRADUATED HIGH SCHOOL OR GED   BARRIERS CO-LEARNER NONE   PRIMARY LANGUAGE ENGLISH   PRIMARY LANGUAGE CO-LEARNER ENGLISH    NEED No   LEARNER PREFERENCE PRIMARY LISTENING   LEARNER PREFERENCE CO-LEARNER LISTENING   LEARNING SPECIAL TOPICS no   ANSWERED BY self   RELATIONSHIP SELF     Depression Screening:     3 most recent PHQ Screens 12/10/2021   Little interest or pleasure in doing things Not at all   Feeling down, depressed, irritable, or hopeless Not at all   Total Score PHQ 2 0     Fall Risk Assessment:     Fall Risk Assessment, last 12 mths 12/10/2021   Able to walk? Yes   Fall in past 12 months? 1   Do you feel unsteady? 0   Are you worried about falling 0   Is the gait abnormal? 0   Number of falls in past 12 months 1   Fall with injury? 0     Abuse Screening:     Abuse Screening Questionnaire 2021   Do you ever feel afraid of your partner? N   Are you in a relationship with someone who physically or mentally threatens you? N   Is it safe for you to go home? Y     ADL Assessment:   No flowsheet data found. Coordination of Care Questionaire:   1. \"Have you been to the ER, urgent care clinic since your last visit?   Hospitalized since your last visit? \" Yes When: 11/25/2021 Where: [facility]  Reason for visit: Stroke    2. \"Have you seen or consulted any other health care providers outside of the [address] [place] [address] since your last visit? \" No     3. For patients aged 39-70: Has the patient had a colonoscopy? Yes, HM satisfied with blue hyperlink     If the patient is female:    4. For patients aged 41-77: Has the patient had a mammogram within the past 2 years? No    5. For patients aged 21-65: Has the patient had a pap smear? Yes, HM satisfied with blue hyperlink    Advanced Directive:   1. Do you have an Advanced Directive? NO    2. Would you like information on Advanced Directives?  NO

## 2022-02-03 ENCOUNTER — OFFICE VISIT (OUTPATIENT)
Dept: CARDIOLOGY CLINIC | Age: 67
End: 2022-02-03
Payer: MEDICARE

## 2022-02-03 VITALS
BODY MASS INDEX: 23.59 KG/M2 | DIASTOLIC BLOOD PRESSURE: 63 MMHG | RESPIRATION RATE: 16 BRPM | WEIGHT: 109 LBS | OXYGEN SATURATION: 99 % | SYSTOLIC BLOOD PRESSURE: 128 MMHG | TEMPERATURE: 97.7 F | HEART RATE: 65 BPM

## 2022-02-03 DIAGNOSIS — R55 SYNCOPE, UNSPECIFIED SYNCOPE TYPE: Primary | ICD-10-CM

## 2022-02-03 DIAGNOSIS — R13.10 DYSPHAGIA, UNSPECIFIED TYPE: ICD-10-CM

## 2022-02-03 PROCEDURE — G8752 SYS BP LESS 140: HCPCS | Performed by: INTERNAL MEDICINE

## 2022-02-03 PROCEDURE — G8400 PT W/DXA NO RESULTS DOC: HCPCS | Performed by: INTERNAL MEDICINE

## 2022-02-03 PROCEDURE — G9899 SCRN MAM PERF RSLTS DOC: HCPCS | Performed by: INTERNAL MEDICINE

## 2022-02-03 PROCEDURE — G8510 SCR DEP NEG, NO PLAN REQD: HCPCS | Performed by: INTERNAL MEDICINE

## 2022-02-03 PROCEDURE — 1101F PT FALLS ASSESS-DOCD LE1/YR: CPT | Performed by: INTERNAL MEDICINE

## 2022-02-03 PROCEDURE — 3017F COLORECTAL CA SCREEN DOC REV: CPT | Performed by: INTERNAL MEDICINE

## 2022-02-03 PROCEDURE — G8536 NO DOC ELDER MAL SCRN: HCPCS | Performed by: INTERNAL MEDICINE

## 2022-02-03 PROCEDURE — G8428 CUR MEDS NOT DOCUMENT: HCPCS | Performed by: INTERNAL MEDICINE

## 2022-02-03 PROCEDURE — 1090F PRES/ABSN URINE INCON ASSESS: CPT | Performed by: INTERNAL MEDICINE

## 2022-02-03 PROCEDURE — G8754 DIAS BP LESS 90: HCPCS | Performed by: INTERNAL MEDICINE

## 2022-02-03 PROCEDURE — G8420 CALC BMI NORM PARAMETERS: HCPCS | Performed by: INTERNAL MEDICINE

## 2022-02-03 PROCEDURE — 93000 ELECTROCARDIOGRAM COMPLETE: CPT | Performed by: INTERNAL MEDICINE

## 2022-02-03 PROCEDURE — 99205 OFFICE O/P NEW HI 60 MIN: CPT | Performed by: INTERNAL MEDICINE

## 2022-02-03 RX ORDER — OMEPRAZOLE 20 MG/1
20 CAPSULE, DELAYED RELEASE ORAL DAILY
Qty: 90 CAPSULE | Refills: 3 | Status: SHIPPED | OUTPATIENT
Start: 2022-02-03 | End: 2022-03-23 | Stop reason: SDUPTHER

## 2022-02-03 NOTE — PROGRESS NOTES
Identified pt with two pt identifiers(name and ). Reviewed record in preparation for visit and have obtained necessary documentation. Kevin Richard presents today for   Chief Complaint   Patient presents with   [facility] New Patient     syncope           Kevin Richard preferred language for health care discussion is english/other. Personal Protective Equipment:   Personal Protective Equipment was used including: mask-surgical and hands-gloves. Patient was placed on no precaution(s). Patient was masked. Precautions:   Patient currently on None  Patient currently roomed with door closed. Is someone accompanying this pt? Yes daughter    Is the patient using any DME equipment during OV? no    Depression Screening:  3 most recent PHQ Screens 2/3/2022   Little interest or pleasure in doing things Not at all   Feeling down, depressed, irritable, or hopeless Not at all   Total Score PHQ 2 0       Learning Assessment:  Learning Assessment 10/15/2015   PRIMARY LEARNER Patient   HIGHEST LEVEL OF EDUCATION - PRIMARY LEARNER  GRADUATED HIGH SCHOOL OR GED   BARRIERS PRIMARY LEARNER LANGUAGE   CO-LEARNER CAREGIVER Yes   CO-LEARNER NAME Azra   CO-LEARNER HIGHEST LEVEL OF EDUCATION GRADUATED HIGH SCHOOL OR GED   BARRIERS CO-LEARNER NONE   PRIMARY LANGUAGE ENGLISH   PRIMARY LANGUAGE CO-LEARNER ENGLISH    NEED No   LEARNER PREFERENCE PRIMARY LISTENING   LEARNER PREFERENCE CO-LEARNER LISTENING   LEARNING SPECIAL TOPICS no   ANSWERED BY self   RELATIONSHIP SELF       Abuse Screening:  Abuse Screening Questionnaire 2021   Do you ever feel afraid of your partner? N   Are you in a relationship with someone who physically or mentally threatens you? N   Is it safe for you to go home? Y       Fall Risk  Fall Risk Assessment, last 12 mths 2/3/2022   Able to walk?  Yes   Fall in past 12 months? -   Do you feel unsteady? -   Are you worried about falling -   Is the gait abnormal? -   Number of falls in past 12 months -   Fall with injury? -       Pt currently taking Anticoagulant therapy? no  Pt currently taking Antiplatelet therapy? no    Coordination of Care:  1. Have you been to the ER, urgent care clinic since your last visit? Hospitalized since your last visit? yes    2. Have you seen or consulted any other health care providers outside of the [address] [place] [address] since your last visit? Include any pap smears or colon screening. no      Please see Red banners under Allergies and Med Rec to remove outside inquires. All correct information has been verified with patient and added to chart.      Medication's patient's would liked removed has been marked not taking to be removed per Verbal order and read back per Debra Drew MD

## 2022-02-03 NOTE — PROGRESS NOTES
[facility]    Ms. Niurka Zamora is a 15-year-old female with CVA, PFO    Patient is here today for cardiac evaluation. Patient is accompanied with the family member. Denies any prior history of MI or CHF. Patient was admitted to hospital in 11/2021 with syncopal episode. During the work-up, patient was found to have a PFO and also MRI suggesting chronic lacunar infarct and chronic bilateral cerebellar infarct. Patient was discharged home. Patient denies any symptoms that is concerning for angina. She does that occasionally she feels like her food gets stuck in the throat and upper chest.  She denies dyspnea. She denies any palpitation  . She denies any lower extremity swelling. History is also confirmed by family member who is present at the time of clinic visit  Denies any nausea, vomiting, abdominal pain, fever, chills, sputum production. No hematuria or other bleeding complaints    Past Medical History:   Diagnosis Date    CVA (cerebral vascular accident) ([facility] [address]     PFO (patent foramen ovale)        Review of Systems:  Cardiac symptoms as noted above in HPI. All others negative. Denies fatigue, malaise, skin rash, joint pain, blurring vision, photophobia, neck pain, hemoptysis, chronic cough, nausea, vomiting, hematuria, burning micturition, BRBPR, chronic headaches. Current Outpatient Medications   Medication Sig    losartan (COZAAR) 50 mg tablet Take 1 Tablet by mouth daily for 90 days.  atorvastatin (LIPITOR) 40 mg tablet Take 1 Tablet by mouth nightly for 90 days.  aspirin 81 mg chewable tablet Take 1 Tablet by mouth daily for 90 days.  albuterol (PROVENTIL HFA, VENTOLIN HFA, PROAIR HFA) 90 mcg/actuation inhaler Take 2 Puffs by inhalation every six (6) hours as needed for Wheezing.  cholecalciferol (VITAMIN D3) (2,000 UNITS /50 MCG) cap capsule Take 1 Capsule by mouth daily for 90 days.  (Patient not taking: Reported on )    garlic 1,004 mg cap One daily (Patient not taking: Reported on 12/10/2021)    multivit/folic acid/vit K1 (ONE-A-DAY WOMEN'S 50 PLUS PO) Take  by mouth daily. (Patient not taking: Reported on 12/10/2021)     No current facility-administered medications for this visit. Past Surgical History:   Procedure Laterality Date    HX TUBAL LIGATION         Allergies and Sensitivities:  Allergies   Allergen Reactions    Flexeril [Cyclobenzaprine] Swelling     Leg swelling    Kiwi Swelling     Throat swelling       Family History:  Family History   Problem Relation Age of Onset    Heart Disease Brother     Heart Disease Sister        Social History:  Social History     Tobacco Use    Smoking status: Former Smoker     Packs/day: 0.25     Years: 45.00     Pack years: 11.25     Types: Cigarettes     Quit date: 2021     Years since quittin.1    Smokeless tobacco: Current User    Tobacco comment: vape   Substance Use Topics    Alcohol use: No     Alcohol/week: 0.0 standard drinks    Drug use: No     She  reports that she quit smoking about 2 months ago. Her smoking use included cigarettes. She has a 11.25 pack-year smoking history. She uses smokeless tobacco.  She  reports no history of alcohol use. Physical Exam:  BP Readings from Last 3 Encounters:   22 128/63   12/10/21 (!) 150/74   21 138/74         Pulse Readings from Last 3 Encounters:   22 65   12/10/21 67   21 72          Wt Readings from Last 3 Encounters:   22 49.4 kg (109 lb)   12/10/21 46.7 kg (103 lb)   21 50.8 kg (112 lb)       Constitutional: Oriented to person, place, and time. HENT: Head: Normocephalic and atraumatic. Eyes: Conjunctivae and extraocular motions are normal.   Neck: No JVD present. Carotid bruit is not appreciated. Cardiovascular: Regular rhythm. No murmur, gallop or rubs appreciated  Lung: Breath sounds normal. No respiratory distress.  No ronchi or rales appreciated  Abdominal: No tenderness. No rebound and no guarding. Musculoskeletal: There is no lower extremity edema. No cynosis  Lymphadenopathy:  No cervical or supraclavicular adenopathy appriciated. Neurological: No gross motor deficit noted. Skin: No visible skin rash noted. No Ear discharge noted  Psychiatric: Normal mood and affect. LABS:   @  Lab Results   Component Value Date/Time    WBC 4.8 06/04/2021 11:21 AM    HGB 12.0 06/04/2021 11:21 AM    HCT 37.5 06/04/2021 11:21 AM    PLATELET 178 32/40/7787 11:21 AM    MCV 93.1 06/04/2021 11:21 AM     Lab Results   Component Value Date/Time    Sodium 142 06/04/2021 11:21 AM    Potassium 3.8 06/04/2021 11:21 AM    Chloride 111 06/04/2021 11:21 AM    CO2 24 06/04/2021 11:21 AM    Glucose 97 06/04/2021 11:21 AM    BUN 18 06/04/2021 11:21 AM    Creatinine 1.22 06/04/2021 11:21 AM     Lipids Latest Ref Rng & Units 6/4/2021 8/8/2019 12/27/2018   Chol, Total <200 MG/(H) 205(H) 186   HDL 40 - 60 MG/DL 96(H) 78(H) 73   LDL 0 - 100 MG/DL 94.8 110. 8(H) 91   Trig <150 MG/(H) 81 108   Chol/HDL Ratio 0 - 5.0   2.4 2.6 -   Some recent data might be hidden     Lab Results   Component Value Date/Time    ALT (SGPT) 44 06/04/2021 11:21 AM     Lab Results   Component Value Date/Time    Hemoglobin A1c 5.9 (H) 06/04/2021 11:21 AM    Hemoglobin A1c (POC) 5.5 10/23/2015 09:32 AM     Lab Results   Component Value Date/Time    TSH 0.532 10/15/2015 11:32 AM     ECHO (11/2021)  CONCLUSIONS     * Left ventricular chamber volume is normal.     * Left ventricular systolic function is normal with an ejection fraction of 61 % by Zelaya's biplane.     * Left ventricular diastolic function: indeterminate.     * Left atrial chamber is mildly enlarged with a left atrial volume index of 38.55 ml/m^2 by BP MOD.     * Right ventricular chamber dimension is normal.     * Right ventricular systolic function is normal with TAPSE measuring 2.15 cm.     * There is mild to moderate mitral valve regurgitation.     * No pulmonary hypertension, estimated pulmonary arterial systolic pressure is 25 mmHg.     * Bubble study was performed and was positive for shunt. STRESS TEST (EST, PHARM, NUC, ECHO etc)    CATHETERIZATION    IMPRESSION & PLAN:  Ms. Dana Askew is a 27-year-old female. Hyperlipidemia:  Currently on atorvastatin 40 mg daily. This was started recently in 11/2021 after having syncopal episode and stroke. Last LDL 94. Consider repeating fasting lipid profile    Hypertension:  Currently on losartan 50 mg daily. Blood pressure 128/63. Continue same for now    PFO and CVA:  Patient was admitted to hospital in 11/2021 with syncopal episode. During the work-up, patient was found to have a PFO based on transthoracic echocardiogram  MRI in 11/2021 suggested chronic microvascular changes, lacunar infarct and chronic bilateral cerebellar hemisphere stroke. 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 Family member is making an appointment with neurology team for follow-up appointment  I recommended patient undergo REGIS for better evaluation of cardiac shunt  Continue aspirin and statin  Risk-benefit alternatives complication discussed with the patient and the family member and they are agreeable. However as patient is complaining of occasional dysphagia, I recommend that she see by GI team and consider having EGD to rule out any mechanical obstruction before proceeding with REGIS. I will make referral to GI team    This plan was discussed with patient who is in agreement. Thank you for allowing me to participate in patient care. Please feel free to call me if you have any question or concern. Ashvin Pena MD  Please note: This document has been produced using voice recognition software. Unrecognized errors in transcription may be present.

## 2022-02-03 NOTE — PATIENT INSTRUCTIONS
**Call office in 30 days for REGIS appt date and time**     New Medication/Medication Changes  Prilosec 20 mg daily   **please allow 24-48 hrs for medication to be escribed to pharmacy** If you need any refills on medications please contact your pharmacy so that the request can be escribed to the provider for review. Other   Referral to Dr Daphne Billingsley- they will contact you for date and time for appointment             Depaul          Patient  EP Instructions      1. You are scheduled to have a REGIS on  TBD at [facility]. Please arrive an hour and 30 min prior to appt. 2. Please go to [facility] and park in the outpatient parking lot. Once you enter check in with the  there. The  will either give you directions or assist you in getting to the cath holding area. 3.  [x]       You are not to eat or drink anything after midnight the morning of the               procedure. 4. Please continue to take your medications with a small sip of water on the morning of the procedure with the following exceptions:      5. If you are diabetic, do not take your insulin/sugar pill the morning of the procedure. Pre -procedure LABWORK is to be done within three days of your procedure date ( COVID) testing. Labs drawn in [facility] [address] Their hours of operation for covid testing are are Monday, Wednesday, Thursday and  Friday 1-3 pm.   If you have and questions regarding directions please contact them at [phone].    6. We encourage families to wait in the waiting room on the first floor while the procedure is being done. The Doctor will come out and talk with you as soon as the procedure is over. 7. There is the possibility that you may spend the night in the hospital, depending on the results of the procedure. This will be determined after the procedure is done.      8.   If you or your family have any questions, please call our office Monday-Friday 9:00am -4:30 pm , at [phone], and ask to speak to one of the nurses.

## 2022-02-03 NOTE — LETTER
2/3/2022    Patient: Tricia Manjarrez   YOB: 1955   Date of Visit: 2/3/2022     Luis Marshall MD  [address]  [address]    Dear Luis Marshall MD,      Thank you for referring Ms. Tricia Manjarrez to [facility] SPECIALIST AT [facility] for evaluation. My notes for this consultation are attached. If you have questions, please do not hesitate to call me. I look forward to following your patient along with you.       Sincerely,    Natalie Muller MD

## 2022-03-23 DIAGNOSIS — R55 SYNCOPE, UNSPECIFIED SYNCOPE TYPE: ICD-10-CM

## 2022-03-23 DIAGNOSIS — J98.01 BRONCHOSPASM: ICD-10-CM

## 2022-03-23 DIAGNOSIS — I10 ESSENTIAL HYPERTENSION: ICD-10-CM

## 2022-03-23 DIAGNOSIS — E78.00 HYPERCHOLESTEREMIA: ICD-10-CM

## 2022-03-23 DIAGNOSIS — G45.9 TIA (TRANSIENT ISCHEMIC ATTACK): ICD-10-CM

## 2022-03-23 NOTE — TELEPHONE ENCOUNTER
Requested Prescriptions     Pending Prescriptions Disp Refills    albuterol (PROVENTIL HFA, VENTOLIN HFA, PROAIR HFA) 90 mcg/actuation inhaler       Sig: Take 2 Puffs by inhalation every six (6) hours as needed for Wheezing.  aspirin 81 mg chewable tablet 90 Tablet 3     Sig: Take 1 Tablet by mouth daily for 90 days.  atorvastatin (LIPITOR) 40 mg tablet 90 Tablet 3     Sig: Take 1 Tablet by mouth nightly for 90 days.  losartan (COZAAR) 50 mg tablet 90 Tablet 1     Sig: Take 1 Tablet by mouth daily for 90 days.  omeprazole (PRILOSEC) 20 mg capsule 90 Capsule 3     Sig: Take 1 Capsule by mouth daily. Pt's son came in to request a refill on behalf of his mother. She believes she is out on some of them. Requesting to be called once the medication has been sent. Please advise.      Future Appointments   Date Time Provider Luma Monsivais   5/12/2022 11:30 AM Selwyn Villegas MD [facility] AMB

## 2022-03-25 RX ORDER — OMEPRAZOLE 20 MG/1
20 CAPSULE, DELAYED RELEASE ORAL DAILY
Qty: 90 CAPSULE | Refills: 0 | Status: SHIPPED | OUTPATIENT
Start: 2022-03-25

## 2022-03-25 RX ORDER — ALBUTEROL SULFATE 90 UG/1
2 AEROSOL, METERED RESPIRATORY (INHALATION)
Qty: 1 EACH | Refills: 3 | Status: SHIPPED | OUTPATIENT
Start: 2022-03-25 | End: 2022-03-28

## 2022-03-25 RX ORDER — ATORVASTATIN CALCIUM 40 MG/1
40 TABLET, FILM COATED ORAL
Qty: 90 TABLET | Refills: 1 | Status: SHIPPED | OUTPATIENT
Start: 2022-03-25 | End: 2022-06-23

## 2022-03-25 RX ORDER — LOSARTAN POTASSIUM 50 MG/1
50 TABLET ORAL DAILY
Qty: 90 TABLET | Refills: 1 | Status: SHIPPED | OUTPATIENT
Start: 2022-03-25 | End: 2022-06-23

## 2022-03-25 RX ORDER — GUAIFENESIN 100 MG/5ML
81 LIQUID (ML) ORAL DAILY
Qty: 90 TABLET | Refills: 3 | Status: SHIPPED | OUTPATIENT
Start: 2022-03-25 | End: 2022-06-23

## 2022-04-27 ENCOUNTER — TELEPHONE (OUTPATIENT)
Dept: MAMMOGRAPHY | Age: 67
End: 2022-04-27

## 2022-04-27 NOTE — TELEPHONE ENCOUNTER
I called  Colby Garces, to assist her  in scheduling a Mammogram .Spoke daughter in law , Patient recently has a stroke and she will call back to reschedule at a later date     Shelby Infante formerly [facility],[phone].

## 2022-05-06 ENCOUNTER — TELEPHONE (OUTPATIENT)
Dept: FAMILY MEDICINE CLINIC | Age: 67
End: 2022-05-06

## 2022-05-06 NOTE — TELEPHONE ENCOUNTER
----- Message from Ariel Cesar sent at 5/5/2022  3:14 PM EDT -----  Subject: Referral Request    QUESTIONS   Reason for referral request? CT of chest w/ IV contrast   Has the physician seen you for this condition before? No   Preferred Specialist (if applicable)? Do you already have an appointment scheduled? No  Additional Information for Provider? [place]  ---------------------------------------------------------------------------  --------------  [address] is the best way for the office to contact you? OK to leave message on   voicemail  Preferred Call Back Phone Number? [phone]  ---------------------------------------------------------------------------  --------------  SCRIPT ANSWERS  Relationship to Patient? Other  Representative Name? April (daughter-in-law)  Is the Representative on the appropriate HIPAA document in Epic?  Yes

## 2022-05-06 NOTE — TELEPHONE ENCOUNTER
Pt's daughter stated that pt was seen @ [address] [place] [address] on 5/3/2022 and they stated that pt had pressure on her heart at 220 and a heart study findings are right hilar pulmonary stress. Pt's daughter was instructed to contact PCP office for an order for a chest ct with w/ contrast ASAP.

## 2022-05-12 ENCOUNTER — OFFICE VISIT (OUTPATIENT)
Dept: CARDIOLOGY CLINIC | Age: 67
End: 2022-05-12
Payer: MEDICARE

## 2022-05-12 VITALS
SYSTOLIC BLOOD PRESSURE: 116 MMHG | OXYGEN SATURATION: 98 % | TEMPERATURE: 97 F | BODY MASS INDEX: 23.72 KG/M2 | DIASTOLIC BLOOD PRESSURE: 76 MMHG | WEIGHT: 109.6 LBS | HEART RATE: 62 BPM

## 2022-05-12 DIAGNOSIS — R55 SYNCOPE, UNSPECIFIED SYNCOPE TYPE: Primary | ICD-10-CM

## 2022-05-12 DIAGNOSIS — R00.2 PALPITATIONS: ICD-10-CM

## 2022-05-12 PROCEDURE — 1101F PT FALLS ASSESS-DOCD LE1/YR: CPT | Performed by: INTERNAL MEDICINE

## 2022-05-12 PROCEDURE — G8400 PT W/DXA NO RESULTS DOC: HCPCS | Performed by: INTERNAL MEDICINE

## 2022-05-12 PROCEDURE — G8752 SYS BP LESS 140: HCPCS | Performed by: INTERNAL MEDICINE

## 2022-05-12 PROCEDURE — G8510 SCR DEP NEG, NO PLAN REQD: HCPCS | Performed by: INTERNAL MEDICINE

## 2022-05-12 PROCEDURE — G8428 CUR MEDS NOT DOCUMENT: HCPCS | Performed by: INTERNAL MEDICINE

## 2022-05-12 PROCEDURE — G9899 SCRN MAM PERF RSLTS DOC: HCPCS | Performed by: INTERNAL MEDICINE

## 2022-05-12 PROCEDURE — 99214 OFFICE O/P EST MOD 30 MIN: CPT | Performed by: INTERNAL MEDICINE

## 2022-05-12 PROCEDURE — 3017F COLORECTAL CA SCREEN DOC REV: CPT | Performed by: INTERNAL MEDICINE

## 2022-05-12 PROCEDURE — G8420 CALC BMI NORM PARAMETERS: HCPCS | Performed by: INTERNAL MEDICINE

## 2022-05-12 PROCEDURE — G8536 NO DOC ELDER MAL SCRN: HCPCS | Performed by: INTERNAL MEDICINE

## 2022-05-12 PROCEDURE — G8754 DIAS BP LESS 90: HCPCS | Performed by: INTERNAL MEDICINE

## 2022-05-12 PROCEDURE — 1090F PRES/ABSN URINE INCON ASSESS: CPT | Performed by: INTERNAL MEDICINE

## 2022-05-12 NOTE — PATIENT INSTRUCTIONS
Xiomara( 30 day)-will be calling you to confirm your address to send your Heart Monitor. Please allow 7-10 business days for monitor to arrive at your home.   Customer Service for [address]:  [phone]

## 2022-05-12 NOTE — PROGRESS NOTES
Identified pt with two pt identifiers(name and ). Reviewed record in preparation for visit and have obtained necessary documentation. Hillary Perez presents today for No chief complaint on file. Pt DENIES DIZZINESS, SOB, CHEST PAIN/ PRESSURE, FATIGUE/WEAKNESS, HEADACHES, SWELLING. Hillary Perez preferred language for health care discussion is english/other. Personal Protective Equipment:   Personal Protective Equipment was used including: mask-surgical and hands-gloves. Patient was placed on no precaution(s). Patient was masked. Precautions:   Patient currently on None  Patient currently roomed with door closed. Is someone accompanying this pt? yes    Is the patient using any DME equipment during OV? No     Depression Screening:  3 most recent PHQ Screens 2/3/2022   Little interest or pleasure in doing things Not at all   Feeling down, depressed, irritable, or hopeless Not at all   Total Score PHQ 2 0       Learning Assessment:  Learning Assessment 10/15/2015   PRIMARY LEARNER Patient   HIGHEST LEVEL OF EDUCATION - PRIMARY LEARNER  GRADUATED HIGH SCHOOL OR GED   BARRIERS PRIMARY LEARNER LANGUAGE   CO-LEARNER CAREGIVER Yes   CO-LEARNER NAME Azra   CO-LEARNER HIGHEST LEVEL OF EDUCATION GRADUATED HIGH SCHOOL OR GED   BARRIERS CO-LEARNER NONE   PRIMARY LANGUAGE ENGLISH   PRIMARY LANGUAGE CO-LEARNER ENGLISH    NEED No   LEARNER PREFERENCE PRIMARY LISTENING   LEARNER PREFERENCE CO-LEARNER LISTENING   LEARNING SPECIAL TOPICS no   ANSWERED BY self   RELATIONSHIP SELF       Abuse Screening:  Abuse Screening Questionnaire 2021   Do you ever feel afraid of your partner? N   Are you in a relationship with someone who physically or mentally threatens you? N   Is it safe for you to go home? Y       Fall Risk  Fall Risk Assessment, last 12 mths 2/3/2022   Able to walk?  Yes   Fall in past 12 months? -   Do you feel unsteady? -   Are you worried about falling -   Is the gait abnormal? -   Number of falls in past 12 months -   Fall with injury? -       Pt currently taking Anticoagulant therapy? No   Pt currently taking Antiplatelet therapy? yes    Coordination of Care:  1. Have you been to the ER, urgent care clinic since your last visit? Hospitalized since your last visit? Yes. [facility] . 05/03/2022. Dizziness and weakness    2. Have you seen or consulted any other health care providers outside of the [address] [place] [address] since your last visit? Include any pap smears or colon screening. Yes. Please see Red banners under Allergies and Med Rec to remove outside inquires. All correct information has been verified with patient and added to chart.      Medication's patient's would liked removed has been marked not taking to be removed per Verbal order and read back per Frantz Askew MD

## 2022-05-12 NOTE — PROGRESS NOTES
Cardiovascular Specialists    Ms. Anastasio Apgar is a 68year old female with CVA, PFO    Patient is here today for cardiac evaluation. Patient is accompanied with the family member. Denies any prior history of MI or CHF. Patient was admitted to hospital in 2021 with syncopal episode. During the work-up, patient was found to have a PFO and also MRI suggesting chronic lacunar infarct and chronic bilateral cerebellar infarct. Patient recently had to go to emergency department with some feeling dizziness and palpitation. He was sent home after basic work-up. History is somewhat limited in being provided by the family member. Does not appear to have any chest pain or chest tightness concerning for angina  Denies any nausea, vomiting, abdominal pain, fever, chills, sputum production. No hematuria or other bleeding complaints    Past Medical History:   Diagnosis Date    CVA (cerebral vascular accident) ([address]     PFO (patent foramen ovale)        Review of Systems:  Cardiac symptoms as noted above in HPI. All others negative. Denies fatigue, malaise, skin rash, joint pain, blurring vision, photophobia, neck pain, hemoptysis, chronic cough, nausea, vomiting, hematuria, burning micturition, BRBPR, chronic headaches. Current Outpatient Medications   Medication Sig    aspirin 81 mg chewable tablet Take 1 Tablet by mouth daily for 90 days.  atorvastatin (LIPITOR) 40 mg tablet Take 1 Tablet by mouth nightly for 90 days.  losartan (COZAAR) 50 mg tablet Take 1 Tablet by mouth daily for 90 days.  albuterol (PROVENTIL HFA, VENTOLIN HFA, PROAIR HFA) 90 mcg/actuation inhaler Take 2 Puffs by inhalation every six (6) hours as needed for Wheezing for up to 3 days.  omeprazole (PRILOSEC) 20 mg capsule Take 1 Capsule by mouth daily.  cholecalciferol (VITAMIN D3) (2,000 UNITS /50 MCG) cap capsule Take 1 Capsule by mouth daily for 90 days.  (Patient not taking: Reported on )    garlic 7,549 mg cap One daily (Patient not taking: Reported on 12/10/2021)    multivit/folic acid/vit K1 (ONE-A-DAY WOMEN'S 50 PLUS PO) Take  by mouth daily. (Patient not taking: Reported on 12/10/2021)     No current facility-administered medications for this visit. Past Surgical History:   Procedure Laterality Date    HX TUBAL LIGATION         Allergies and Sensitivities:  Allergies   Allergen Reactions    Flexeril [Cyclobenzaprine] Swelling     Leg swelling    Kiwi Swelling     Throat swelling       Family History:  Family History   Problem Relation Age of Onset    Heart Disease Brother     Heart Disease Sister        Social History:  Social History     Tobacco Use    Smoking status: Former Smoker     Packs/day: 0.25     Years: 45.00     Pack years: 11.25     Types: Cigarettes     Quit date: 2021     Years since quittin.4    Smokeless tobacco: Current User    Tobacco comment: vape   Substance Use Topics    Alcohol use: No     Alcohol/week: 0.0 standard drinks    Drug use: No     She  reports that she quit smoking about 5 months ago. Her smoking use included cigarettes. She has a 11.25 pack-year smoking history. She uses smokeless tobacco.  She  reports no history of alcohol use. Physical Exam:  BP Readings from Last 3 Encounters:   22 116/76   22 128/63   12/10/21 (!) 150/74         Pulse Readings from Last 3 Encounters:   22 62   22 65   12/10/21 67          Wt Readings from Last 3 Encounters:   22 49.7 kg (109 lb 9.6 oz)   22 49.4 kg (109 lb)   12/10/21 46.7 kg (103 lb)       Constitutional: Oriented to person, place, and time. HENT: Head: Normocephalic and atraumatic. Cardiovascular: Regular rhythm. No murmur, gallop or rubs appreciated  Lung: Breath sounds normal. No respiratory distress. No ronchi or rales appreciated  Abdominal: No tenderness. No rebound and no guarding.    Musculoskeletal: There is no lower extremity edema. No cynosis    LABS:   @  Lab Results   Component Value Date/Time    WBC 4.8 06/04/2021 11:21 AM    HGB 12.0 06/04/2021 11:21 AM    HCT 37.5 06/04/2021 11:21 AM    PLATELET 544 42/90/1778 11:21 AM    MCV 93.1 06/04/2021 11:21 AM     Lab Results   Component Value Date/Time    Sodium 142 06/04/2021 11:21 AM    Potassium 3.8 06/04/2021 11:21 AM    Chloride 111 06/04/2021 11:21 AM    CO2 24 06/04/2021 11:21 AM    Glucose 97 06/04/2021 11:21 AM    BUN 18 06/04/2021 11:21 AM    Creatinine 1.22 06/04/2021 11:21 AM     Lipids Latest Ref Rng & Units 6/4/2021 8/8/2019 12/27/2018   Chol, Total <200 MG/(H) 205(H) 186   HDL 40 - 60 MG/DL 96(H) 78(H) 73   LDL 0 - 100 MG/DL 94.8 110. 8(H) 91   Trig <150 MG/(H) 81 108   Chol/HDL Ratio 0 - 5.0   2.4 2.6 -   Some recent data might be hidden     Lab Results   Component Value Date/Time    ALT (SGPT) 44 06/04/2021 11:21 AM     Lab Results   Component Value Date/Time    Hemoglobin A1c 5.9 (H) 06/04/2021 11:21 AM    Hemoglobin A1c (POC) 5.5 10/23/2015 09:32 AM     Lab Results   Component Value Date/Time    TSH 0.532 10/15/2015 11:32 AM     ECHO (11/2021)  CONCLUSIONS     * Left ventricular chamber volume is normal.     * Left ventricular systolic function is normal with an ejection fraction of 61 % by Zelaya's biplane.     * Left ventricular diastolic function: indeterminate.     * Left atrial chamber is mildly enlarged with a left atrial volume index of 38.55 ml/m^2 by BP MOD.     * Right ventricular chamber dimension is normal.     * Right ventricular systolic function is normal with TAPSE measuring 2.15 cm.     * There is mild to moderate mitral valve regurgitation.     * No pulmonary hypertension, estimated pulmonary arterial systolic pressure is 25 mmHg.     * Bubble study was performed and was positive for shunt.       STRESS TEST (EST, PHARM, NUC, ECHO etc)    CATHETERIZATION    IMPRESSION & PLAN:  Ms. Elbert Lowe is a 68 year (per patient) old female. Hyperlipidemia:  Currently on atorvastatin 40 mg daily. This was started recently in 11/2021 after having syncopal episode and stroke. Last LDL 94. Consider repeating fasting lipid profile    Hypertension:  Currently on losartan 50 mg daily. Blood pressure 128/63. Continue same for now    PFO and CVA:  Patient was admitted to hospital in 11/2021 with syncopal episode. During the work-up, patient was found to have a PFO based on transthoracic echocardiogram  MRI in 11/2021 suggested chronic microvascular changes, lacunar infarct and chronic bilateral cerebellar hemisphere stroke. 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 Family member is making an appointment with neurology team for follow-up appointment  I recommended patient undergo REGIS for better evaluation of cardiac shunt  Continue aspirin and statin  Risk-benefit alternatives complication discussed with the patient and the family member and they are agreeable. However as patient is complaining of occasional dysphagia, I recommend that she see by GI team and consider having EGD to rule out any mechanical obstruction before proceeding with REGIS. I will make referral to GI team again    Palpitation:  Will place event monitor to rule out any arrhythmia. No presyncope or syncope    This plan was discussed with patient who is in agreement. Thank you for allowing me to participate in patient care. Please feel free to call me if you have any question or concern. Leslie Collet, MD  Please note: This document has been produced using voice recognition software. Unrecognized errors in transcription may be present.

## 2022-09-12 ENCOUNTER — PATIENT MESSAGE (OUTPATIENT)
Dept: FAMILY MEDICINE CLINIC | Age: 67
End: 2022-09-12

## 2022-10-03 ENCOUNTER — TELEPHONE (OUTPATIENT)
Dept: MAMMOGRAPHY | Age: 67
End: 2022-10-03

## 2022-12-28 DIAGNOSIS — R55 SYNCOPE, UNSPECIFIED SYNCOPE TYPE: ICD-10-CM

## 2022-12-28 DIAGNOSIS — I10 ESSENTIAL HYPERTENSION: ICD-10-CM

## 2022-12-28 DIAGNOSIS — G45.9 TIA (TRANSIENT ISCHEMIC ATTACK): ICD-10-CM

## 2022-12-28 DIAGNOSIS — E78.00 HYPERCHOLESTEREMIA: ICD-10-CM

## 2022-12-28 DIAGNOSIS — J98.01 BRONCHOSPASM: ICD-10-CM

## 2022-12-28 RX ORDER — OMEPRAZOLE 20 MG/1
20 CAPSULE, DELAYED RELEASE ORAL DAILY
Qty: 90 CAPSULE | Refills: 0 | Status: SHIPPED | OUTPATIENT
Start: 2022-12-28

## 2022-12-28 RX ORDER — ATORVASTATIN CALCIUM 40 MG/1
40 TABLET, FILM COATED ORAL
Qty: 90 TABLET | Refills: 0 | Status: SHIPPED | OUTPATIENT
Start: 2022-12-28 | End: 2023-03-28

## 2022-12-28 RX ORDER — GUAIFENESIN 100 MG/5ML
81 LIQUID (ML) ORAL DAILY
Qty: 90 TABLET | Refills: 3 | Status: SHIPPED | OUTPATIENT
Start: 2022-12-28 | End: 2023-03-28

## 2022-12-28 RX ORDER — LOSARTAN POTASSIUM 50 MG/1
50 TABLET ORAL DAILY
Qty: 30 TABLET | Refills: 0 | Status: SHIPPED | OUTPATIENT
Start: 2022-12-28 | End: 2023-01-27

## 2022-12-28 RX ORDER — ALBUTEROL SULFATE 90 UG/1
2 AEROSOL, METERED RESPIRATORY (INHALATION)
Qty: 1 EACH | Refills: 0 | Status: SHIPPED | OUTPATIENT
Start: 2022-12-28 | End: 2022-12-31

## 2022-12-28 NOTE — TELEPHONE ENCOUNTER
Requested Prescriptions     Pending Prescriptions Disp Refills    losartan (COZAAR) 50 mg tablet 90 Tablet 1     Sig: Take 1 Tablet by mouth daily for 90 days. albuterol (PROVENTIL HFA, VENTOLIN HFA, PROAIR HFA) 90 mcg/actuation inhaler 1 Each 3     Sig: Take 2 Puffs by inhalation every six (6) hours as needed for Wheezing for up to 3 days. atorvastatin (LIPITOR) 40 mg tablet 90 Tablet 1     Sig: Take 1 Tablet by mouth nightly for 90 days. aspirin 81 mg chewable tablet 90 Tablet 3     Sig: Take 1 Tablet by mouth daily for 90 days. omeprazole (PRILOSEC) 20 mg capsule 90 Capsule 0     Sig: Take 1 Capsule by mouth daily. Pt's son called to request refills before her next appt. Please advise.      Future Appointments   Date Time Provider Luma Monsivais   1/18/2023 10:30 AM MD YVONNE Jimenez BS AMB

## 2023-01-18 ENCOUNTER — APPOINTMENT (OUTPATIENT)
Dept: FAMILY MEDICINE CLINIC | Age: 68
End: 2023-01-18

## 2023-01-18 ENCOUNTER — HOSPITAL ENCOUNTER (OUTPATIENT)
Dept: LAB | Age: 68
Discharge: HOME | End: 2023-01-18
Payer: MEDICARE

## 2023-01-18 ENCOUNTER — OFFICE VISIT (OUTPATIENT)
Dept: FAMILY MEDICINE CLINIC | Age: 68
End: 2023-01-18
Payer: MEDICARE

## 2023-01-18 VITALS
HEART RATE: 68 BPM | OXYGEN SATURATION: 95 % | HEIGHT: 57 IN | SYSTOLIC BLOOD PRESSURE: 130 MMHG | RESPIRATION RATE: 15 BRPM | WEIGHT: 113 LBS | DIASTOLIC BLOOD PRESSURE: 74 MMHG | BODY MASS INDEX: 24.38 KG/M2 | TEMPERATURE: 97.4 F

## 2023-01-18 DIAGNOSIS — R92.8 MAMMOGRAM ABNORMAL: ICD-10-CM

## 2023-01-18 DIAGNOSIS — N18.30 STAGE 3 CHRONIC KIDNEY DISEASE, UNSPECIFIED WHETHER STAGE 3A OR 3B CKD (HCC): ICD-10-CM

## 2023-01-18 DIAGNOSIS — N39.41 URGE INCONTINENCE OF URINE: ICD-10-CM

## 2023-01-18 DIAGNOSIS — N18.30 STAGE 3 CHRONIC KIDNEY DISEASE, UNSPECIFIED WHETHER STAGE 3A OR 3B CKD (HCC): Primary | ICD-10-CM

## 2023-01-18 DIAGNOSIS — R73.03 PREDIABETES: ICD-10-CM

## 2023-01-18 DIAGNOSIS — Z78.0 MENOPAUSE: ICD-10-CM

## 2023-01-18 DIAGNOSIS — I10 ESSENTIAL HYPERTENSION: ICD-10-CM

## 2023-01-18 DIAGNOSIS — R26.9 ABNORMAL GAIT: ICD-10-CM

## 2023-01-18 DIAGNOSIS — R41.3 MEMORY LOSS: ICD-10-CM

## 2023-01-18 DIAGNOSIS — Z12.11 COLON CANCER SCREENING: ICD-10-CM

## 2023-01-18 DIAGNOSIS — E78.00 HYPERCHOLESTEREMIA: ICD-10-CM

## 2023-01-18 DIAGNOSIS — Z12.31 BREAST CANCER SCREENING BY MAMMOGRAM: ICD-10-CM

## 2023-01-18 DIAGNOSIS — Z12.31 ENCOUNTER FOR SCREENING MAMMOGRAM FOR MALIGNANT NEOPLASM OF BREAST: ICD-10-CM

## 2023-01-18 DIAGNOSIS — N39.41 URGE INCONTINENCE: ICD-10-CM

## 2023-01-18 DIAGNOSIS — E78.2 MIXED HYPERLIPIDEMIA: ICD-10-CM

## 2023-01-18 DIAGNOSIS — E55.9 VITAMIN D DEFICIENCY: ICD-10-CM

## 2023-01-18 LAB
ALBUMIN SERPL-MCNC: 4.3 G/DL (ref 3.4–5)
ALBUMIN/GLOB SERPL: 1.3 (ref 0.8–1.7)
ALP SERPL-CCNC: 98 U/L (ref 45–117)
ALT SERPL-CCNC: 34 U/L (ref 13–56)
ANION GAP SERPL CALC-SCNC: 6 MMOL/L (ref 3–18)
AST SERPL-CCNC: 24 U/L (ref 10–38)
BASOPHILS # BLD: 0.1 K/UL (ref 0–0.1)
BASOPHILS NFR BLD: 1 % (ref 0–2)
BILIRUB SERPL-MCNC: 0.6 MG/DL (ref 0.2–1)
BUN SERPL-MCNC: 10 MG/DL (ref 7–18)
BUN/CREAT SERPL: 9 (ref 12–20)
CALCIUM SERPL-MCNC: 9.8 MG/DL (ref 8.5–10.1)
CHLORIDE SERPL-SCNC: 104 MMOL/L (ref 100–111)
CHOLEST SERPL-MCNC: 167 MG/DL
CO2 SERPL-SCNC: 26 MMOL/L (ref 21–32)
CREAT SERPL-MCNC: 1.16 MG/DL (ref 0.6–1.3)
DIFFERENTIAL METHOD BLD: NORMAL
EOSINOPHIL # BLD: 0.1 K/UL (ref 0–0.4)
EOSINOPHIL NFR BLD: 1 % (ref 0–5)
ERYTHROCYTE [DISTWIDTH] IN BLOOD BY AUTOMATED COUNT: 12.2 % (ref 11.6–14.5)
GLOBULIN SER CALC-MCNC: 3.3 G/DL (ref 2–4)
GLUCOSE SERPL-MCNC: 108 MG/DL (ref 74–99)
HBA1C MFR BLD: 6.2 % (ref 4.2–5.6)
HCT VFR BLD AUTO: 38.7 % (ref 35–45)
HDLC SERPL-MCNC: 97 MG/DL (ref 40–60)
HDLC SERPL: 1.7 (ref 0–5)
HGB BLD-MCNC: 12.8 G/DL (ref 12–16)
IMM GRANULOCYTES # BLD AUTO: 0 K/UL (ref 0–0.04)
IMM GRANULOCYTES NFR BLD AUTO: 0 % (ref 0–0.5)
LDLC SERPL CALC-MCNC: 56 MG/DL (ref 0–100)
LIPID PROFILE,FLP: ABNORMAL
LYMPHOCYTES # BLD: 2.2 K/UL (ref 0.9–3.6)
LYMPHOCYTES NFR BLD: 33 % (ref 21–52)
MCH RBC QN AUTO: 30.5 PG (ref 24–34)
MCHC RBC AUTO-ENTMCNC: 33.1 G/DL (ref 31–37)
MCV RBC AUTO: 92.1 FL (ref 78–100)
MONOCYTES # BLD: 0.4 K/UL (ref 0.05–1.2)
MONOCYTES NFR BLD: 7 % (ref 3–10)
NEUTS SEG # BLD: 4 K/UL (ref 1.8–8)
NEUTS SEG NFR BLD: 58 % (ref 40–73)
NRBC # BLD: 0 K/UL (ref 0–0.01)
NRBC BLD-RTO: 0 PER 100 WBC
PLATELET # BLD AUTO: 274 K/UL (ref 135–420)
PMV BLD AUTO: 11.5 FL (ref 9.2–11.8)
POTASSIUM SERPL-SCNC: 4.5 MMOL/L (ref 3.5–5.5)
PROT SERPL-MCNC: 7.6 G/DL (ref 6.4–8.2)
RBC # BLD AUTO: 4.2 M/UL (ref 4.2–5.3)
SODIUM SERPL-SCNC: 136 MMOL/L (ref 136–145)
TRIGL SERPL-MCNC: 70 MG/DL (ref ?–150)
VLDLC SERPL CALC-MCNC: 14 MG/DL
WBC # BLD AUTO: 6.8 K/UL (ref 4.6–13.2)

## 2023-01-18 PROCEDURE — 3017F COLORECTAL CA SCREEN DOC REV: CPT | Performed by: LEGAL MEDICINE

## 2023-01-18 PROCEDURE — 99214 OFFICE O/P EST MOD 30 MIN: CPT | Performed by: LEGAL MEDICINE

## 2023-01-18 PROCEDURE — 80061 LIPID PANEL: CPT

## 2023-01-18 PROCEDURE — 1101F PT FALLS ASSESS-DOCD LE1/YR: CPT | Performed by: LEGAL MEDICINE

## 2023-01-18 PROCEDURE — 1123F ACP DISCUSS/DSCN MKR DOCD: CPT | Performed by: LEGAL MEDICINE

## 2023-01-18 PROCEDURE — G8427 DOCREV CUR MEDS BY ELIG CLIN: HCPCS | Performed by: LEGAL MEDICINE

## 2023-01-18 PROCEDURE — 3075F SYST BP GE 130 - 139MM HG: CPT | Performed by: LEGAL MEDICINE

## 2023-01-18 PROCEDURE — 83036 HEMOGLOBIN GLYCOSYLATED A1C: CPT

## 2023-01-18 PROCEDURE — G9899 SCRN MAM PERF RSLTS DOC: HCPCS | Performed by: LEGAL MEDICINE

## 2023-01-18 PROCEDURE — 85025 COMPLETE CBC W/AUTO DIFF WBC: CPT

## 2023-01-18 PROCEDURE — 1090F PRES/ABSN URINE INCON ASSESS: CPT | Performed by: LEGAL MEDICINE

## 2023-01-18 PROCEDURE — G8420 CALC BMI NORM PARAMETERS: HCPCS | Performed by: LEGAL MEDICINE

## 2023-01-18 PROCEDURE — 36415 COLL VENOUS BLD VENIPUNCTURE: CPT

## 2023-01-18 PROCEDURE — G8536 NO DOC ELDER MAL SCRN: HCPCS | Performed by: LEGAL MEDICINE

## 2023-01-18 PROCEDURE — G8510 SCR DEP NEG, NO PLAN REQD: HCPCS | Performed by: LEGAL MEDICINE

## 2023-01-18 PROCEDURE — 3078F DIAST BP <80 MM HG: CPT | Performed by: LEGAL MEDICINE

## 2023-01-18 PROCEDURE — G8400 PT W/DXA NO RESULTS DOC: HCPCS | Performed by: LEGAL MEDICINE

## 2023-01-18 PROCEDURE — 80053 COMPREHEN METABOLIC PANEL: CPT

## 2023-01-18 NOTE — PROGRESS NOTES
Naz Caban     Chief Complaint   Patient presents with    Follow-up     Vitals:    23 1033   BP: 130/74   Pulse: 68   Resp: 15   Temp: 97.4 °F (36.3 °C)   TempSrc: Temporal   SpO2: 95%   Weight: 113 lb (51.3 kg)   Height: 4' 9\" (1.448 m)   PainSc:   5   PainLoc: Leg         HPI:Dulcesima EscobarIs here for follow-up and she is accompanied by her daughter-in-law  Patient has been  with her daughter in another state for few months she has not followed up since 2021   She is due for Medicare wellness that will be done next visit since she has not been here in over 1 year  Patient has decreased and her memory which has been worsening since last year      Past Medical History:   Diagnosis Date    CVA (cerebral vascular accident) ([address]     PFO (patent foramen ovale)      Past Surgical History:   Procedure Laterality Date    HX TUBAL LIGATION       Social History     Tobacco Use    Smoking status: Former     Packs/day: 0.25     Years: 45.00     Pack years: 11.25     Types: Cigarettes     Quit date: 2021     Years since quittin.1    Smokeless tobacco: Current    Tobacco comments:     vape   Substance Use Topics    Alcohol use: No     Alcohol/week: 0.0 standard drinks       Family History   Problem Relation Age of Onset    Heart Disease Brother     Heart Disease Sister        Review of Systems   Constitutional:  Negative for chills, fever, malaise/fatigue and weight loss. HENT:  Negative for congestion, ear discharge, ear pain, hearing loss, nosebleeds, sinus pain and sore throat. Eyes:  Negative for blurred vision, double vision and discharge. Respiratory:  Negative for cough, hemoptysis, sputum production, shortness of breath, wheezing and stridor. Cardiovascular:  Negative for chest pain, palpitations, claudication and leg swelling. Gastrointestinal:  Negative for abdominal pain, blood in stool, constipation, diarrhea, melena, nausea and vomiting.    Genitourinary:  Positive for urgency. Negative for dysuria, flank pain, frequency and hematuria. Musculoskeletal:  Positive for joint pain and myalgias. Negative for back pain, falls and neck pain. Skin:  Negative for itching and rash. Neurological:  Negative for dizziness, tingling, sensory change, speech change, focal weakness, seizures, loss of consciousness, weakness and headaches. Psychiatric/Behavioral:  Positive for memory loss. Negative for depression, hallucinations, substance abuse and suicidal ideas. The patient has insomnia. The patient is not nervous/anxious. Physical Exam  Vitals and nursing note reviewed. Constitutional:       General: She is not in acute distress. Appearance: She is well-developed. She is not diaphoretic. HENT:      Head: Normocephalic and atraumatic. Eyes:      General: No scleral icterus. Right eye: No discharge. Left eye: No discharge. Conjunctiva/sclera: Conjunctivae normal.      Pupils: Pupils are equal, round, and reactive to light. Neck:      Thyroid: No thyromegaly. Cardiovascular:      Rate and Rhythm: Normal rate and regular rhythm. Heart sounds: Normal heart sounds. No murmur heard. Pulmonary:      Effort: Pulmonary effort is normal. No respiratory distress. Breath sounds: Normal breath sounds. No stridor. No wheezing, rhonchi or rales. Chest:      Chest wall: No tenderness. Abdominal:      General: There is no distension. Palpations: Abdomen is soft. Tenderness: There is no abdominal tenderness. There is no rebound. Musculoskeletal:         General: No swelling, tenderness, deformity or signs of injury. Normal range of motion. Cervical back: Normal range of motion and neck supple. Right lower leg: No edema. Left lower leg: No edema. Lymphadenopathy:      Cervical: No cervical adenopathy. Skin:     General: Skin is warm and dry. Coloration: Skin is not pale. Findings: No erythema or rash. Neurological:      Mental Status: She is alert and oriented to person, place, and time. Cranial Nerves: No cranial nerve deficit. Coordination: Coordination normal.      Gait: Gait abnormal.   Psychiatric:         Behavior: Behavior normal.         Thought Content: Thought content normal.         Judgment: Judgment normal.        Assessment and plan     Plan of care has been discussed with the patient, he agrees to the plan and verbalized understanding. All his questions were answered  More than 50% of the time spent in this visit was counseling the patient about  illness and treatment options         1. Stage 3 chronic kidney disease, unspecified whether stage 3a or 3b CKD ([facility] [address]    - LIPID PANEL; Future  - METABOLIC PANEL, COMPREHENSIVE; Future  - HEMOGLOBIN A1C W/O EAG; Future  - CBC WITH AUTOMATED DIFF; Future    2. Essential hypertension  Blood pressures well controlled on current medications  - LIPID PANEL; Future  - METABOLIC PANEL, COMPREHENSIVE; Future  - CBC WITH AUTOMATED DIFF; Future    3. Hypercholesteremia  She is adherent to statins  - LIPID PANEL; Future  - METABOLIC PANEL, COMPREHENSIVE; Future    4. Breast cancer screening by mammogram  Mammogram has been ordered need to be scheduled    5. Vitamin D deficiency  She takes vitamin D 2000 unit daily    6. Prediabetes  No medication lifestyle modification  - HEMOGLOBIN A1C W/O EAG; Future    7. Abnormal gait    - REFERRAL TO NEUROLOGY    8. Menopause    - DEXA BONE DENSITY STUDY AXIAL; Future    9. Memory loss    - REFERRAL TO NEUROLOGY    10. Mixed hyperlipidemia    - LIPID PANEL; Future    11. Urge incontinence of urine      12. Mammogram abnormal  I could not find any records for abnormal mammogram  - [facility] MAMMO BI SCREENING INCL CAD; Future    13. Colon cancer screening    - REFERRAL TO GASTROENTEROLOGY    15. Encounter for screening mammogram for malignant neoplasm of breast     - [facility] MAMMO BI SCREENING INCL CAD; Future    15.  Urge incontinence  Patient is wearing adult diapers    Current Outpatient Medications   Medication Sig Dispense Refill    losartan (COZAAR) 50 mg tablet Take 1 Tablet by mouth daily for 30 days. 30 Tablet 0    albuterol (PROVENTIL HFA, VENTOLIN HFA, PROAIR HFA) 90 mcg/actuation inhaler Take 2 Puffs by inhalation every six (6) hours as needed for Wheezing for up to 3 days. 1 Each 0    atorvastatin (LIPITOR) 40 mg tablet Take 1 Tablet by mouth nightly for 90 days. 90 Tablet 0    aspirin 81 mg chewable tablet Take 1 Tablet by mouth daily for 90 days. 90 Tablet 3    omeprazole (PRILOSEC) 20 mg capsule Take 1 Capsule by mouth daily. 90 Capsule 0    cholecalciferol (VITAMIN D3) (2,000 UNITS /50 MCG) cap capsule Take 1 Capsule by mouth daily for 90 days. 90 Capsule 1    garlic 0,203 mg cap One daily 90 Capsule 0    multivit/folic acid/vit K1 (ONE-A-DAY WOMEN'S 50 PLUS PO) Take  by mouth daily. Patient Active Problem List    Diagnosis Date Noted    Stage 3 chronic kidney disease, unspecified whether stage 3a or 3b CKD ([facility] [address] 01/18/2023    Hypercholesteremia 10/28/2015    Vitamin D deficiency 10/18/2011    HTN (hypertension) 10/17/2011     Results for orders placed or performed during the hospital encounter of 06/04/21   VITAMIN B12   Result Value Ref Range    Vitamin B12 1,846 (H) 211 - 911 pg/mL   CBC WITH AUTOMATED DIFF   Result Value Ref Range    WBC 4.8 4.6 - 13.2 K/uL    RBC 4.03 (L) 4.20 - 5.30 M/uL    HGB 12.0 12.0 - 16.0 g/dL    HCT 37.5 35.0 - 45.0 %    MCV 93.1 74.0 - 97.0 FL    MCH 29.8 24.0 - 34.0 PG    MCHC 32.0 31.0 - 37.0 g/dL    RDW 13.2 11.6 - 14.5 %    PLATELET 428 348 - 936 K/uL    MPV 10.9 9.2 - 11.8 FL    NEUTROPHILS 49 40 - 73 %    LYMPHOCYTES 38 21 - 52 %    MONOCYTES 10 3 - 10 %    EOSINOPHILS 3 0 - 5 %    BASOPHILS 1 0 - 2 %    ABS. NEUTROPHILS 2.4 1.8 - 8.0 K/UL    ABS. LYMPHOCYTES 1.8 0.9 - 3.6 K/UL    ABS. MONOCYTES 0.5 0.05 - 1.2 K/UL    ABS. EOSINOPHILS 0.1 0.0 - 0.4 K/UL    ABS.  BASOPHILS 0.0 0.0 - 0.1 K/UL    DF AUTOMATED     LIPID PANEL   Result Value Ref Range    LIPID PROFILE          Cholesterol, total 232 (H) <200 MG/DL    Triglyceride 206 (H) <150 MG/DL    HDL Cholesterol 96 (H) 40 - 60 MG/DL    LDL, calculated 94.8 0 - 100 MG/DL    VLDL, calculated 41.2 MG/DL    CHOL/HDL Ratio 2.4 0 - 5.0     METABOLIC PANEL, COMPREHENSIVE   Result Value Ref Range    Sodium 142 136 - 145 mmol/L    Potassium 3.8 3.5 - 5.5 mmol/L    Chloride 111 100 - 111 mmol/L    CO2 24 21 - 32 mmol/L    Anion gap 7 3.0 - 18 mmol/L    Glucose 97 74 - 99 mg/dL    BUN 18 7.0 - 18 MG/DL    Creatinine 1.22 0.6 - 1.3 MG/DL    BUN/Creatinine ratio 15 12 - 20      GFR est AA 53 (L) >60 ml/min/1.73m2    GFR est non-AA 44 (L) >60 ml/min/1.73m2    Calcium 8.5 8.5 - 10.1 MG/DL    Bilirubin, total 0.8 0.2 - 1.0 MG/DL    ALT (SGPT) 44 13 - 56 U/L    AST (SGOT) 35 10 - 38 U/L    Alk. phosphatase 72 45 - 117 U/L    Protein, total 7.5 6.4 - 8.2 g/dL    Albumin 4.2 3.4 - 5.0 g/dL    Globulin 3.3 2.0 - 4.0 g/dL    A-G Ratio 1.3 0.8 - 1.7     HEMOGLOBIN A1C WITH EAG   Result Value Ref Range    Hemoglobin A1c 5.9 (H) 4.2 - 5.6 %    Est. average glucose 123 mg/dL   VITAMIN D, 25 HYDROXY   Result Value Ref Range    Vitamin D 25-Hydroxy 36.1 30 - 100 ng/mL     No visits with results within 3 Month(s) from this visit.    Latest known visit with results is:   Hospital Outpatient Visit on 06/04/2021   Component Date Value Ref Range Status    Vitamin B12 06/04/2021 1,846 (A)  211 - 911 pg/mL Final    WBC 06/04/2021 4.8  4.6 - 13.2 K/uL Final    RBC 06/04/2021 4.03 (A)  4.20 - 5.30 M/uL Final    HGB 06/04/2021 12.0  12.0 - 16.0 g/dL Final    HCT 06/04/2021 37.5  35.0 - 45.0 % Final    MCV 06/04/2021 93.1  74.0 - 97.0 FL Final    MCH 06/04/2021 29.8  24.0 - 34.0 PG Final    MCHC 06/04/2021 32.0  31.0 - 37.0 g/dL Final    RDW 06/04/2021 13.2  11.6 - 14.5 % Final    PLATELET 19/26/0436 526  135 - 420 K/uL Final    MPV 06/04/2021 10.9  9.2 - 11.8 FL Final NEUTROPHILS 06/04/2021 49  40 - 73 % Final    LYMPHOCYTES 06/04/2021 38  21 - 52 % Final    MONOCYTES 06/04/2021 10  3 - 10 % Final    EOSINOPHILS 06/04/2021 3  0 - 5 % Final    BASOPHILS 06/04/2021 1  0 - 2 % Final    ABS. NEUTROPHILS 06/04/2021 2.4  1.8 - 8.0 K/UL Final    ABS. LYMPHOCYTES 06/04/2021 1.8  0.9 - 3.6 K/UL Final    ABS. MONOCYTES 06/04/2021 0.5  0.05 - 1.2 K/UL Final    ABS. EOSINOPHILS 06/04/2021 0.1  0.0 - 0.4 K/UL Final    ABS. BASOPHILS 06/04/2021 0.0  0.0 - 0.1 K/UL Final    DF 06/04/2021 AUTOMATED    Final    LIPID PROFILE 06/04/2021        Final    Cholesterol, total 06/04/2021 232 (A)  <200 MG/DL Final    Triglyceride 06/04/2021 206 (A)  <150 MG/DL Final    Comment: The drugs N-acetylcysteine (NAC) and  Metamiszole have been found to cause falsely  low results in this chemical assay. Please  be sure to submit blood samples obtained  BEFORE administration of either of these  drugs to assure correct results.       HDL Cholesterol 06/04/2021 96 (A)  40 - 60 MG/DL Final    LDL, calculated 06/04/2021 94.8  0 - 100 MG/DL Final    VLDL, calculated 06/04/2021 41.2  MG/DL Final    CHOL/HDL Ratio 06/04/2021 2.4  0 - 5.0   Final    Sodium 06/04/2021 142  136 - 145 mmol/L Final    Potassium 06/04/2021 3.8  3.5 - 5.5 mmol/L Final    Chloride 06/04/2021 111  100 - 111 mmol/L Final    CO2 06/04/2021 24  21 - 32 mmol/L Final    Anion gap 06/04/2021 7  3.0 - 18 mmol/L Final    Glucose 06/04/2021 97  74 - 99 mg/dL Final    BUN 06/04/2021 18  7.0 - 18 MG/DL Final    Creatinine 06/04/2021 1.22  0.6 - 1.3 MG/DL Final    BUN/Creatinine ratio 06/04/2021 15  12 - 20   Final    GFR est AA 06/04/2021 53 (A)  >60 ml/min/1.73m2 Final    GFR est non-AA 06/04/2021 44 (A)  >60 ml/min/1.73m2 Final    Comment: (NOTE)  Estimated GFR is calculated using the Modification of Diet in Renal   Disease (MDRD) Study equation, reported for both  Americans   (GFRAA) and non- Americans (GFRNA), and normalized to 1.73m2 body surface area. The physician must decide which value applies to   the patient. The MDRD study equation should only be used in   individuals age 25 or older. It has not been validated for the   following: pregnant women, patients with serious comorbid conditions,   or on certain medications, or persons with extremes of body size,   muscle mass, or nutritional status. Calcium 06/04/2021 8.5  8.5 - 10.1 MG/DL Final    Bilirubin, total 06/04/2021 0.8  0.2 - 1.0 MG/DL Final    ALT (SGPT) 06/04/2021 44  13 - 56 U/L Final    AST (SGOT) 06/04/2021 35  10 - 38 U/L Final    Alk. phosphatase 06/04/2021 72  45 - 117 U/L Final    Protein, total 06/04/2021 7.5  6.4 - 8.2 g/dL Final    Albumin 06/04/2021 4.2  3.4 - 5.0 g/dL Final    Globulin 06/04/2021 3.3  2.0 - 4.0 g/dL Final    A-G Ratio 06/04/2021 1.3  0.8 - 1.7   Final    Hemoglobin A1c 06/04/2021 5.9 (A)  4.2 - 5.6 % Final    Comment: (NOTE)  HbA1C Interpretive Ranges  <5.7              Normal  5.7 - 6.4         Consider Prediabetes  >6.5              Consider Diabetes      Est. average glucose 06/04/2021 123  mg/dL Final    Comment: (NOTE)  The eAG should be interpreted with patient characteristics in mind   since ethnicity, interindividual differences, red cell lifespan,   variation in rates of glycation, etc. may affect the validity of the   calculation. Vitamin D 25-Hydroxy 06/04/2021 36.1  30 - 100 ng/mL Final    Comment: (NOTE)  Deficiency               <20 ng/mL  Insufficiency          20-30 ng/mL  Sufficient             ng/mL  Possible toxicity       >100 ng/mL    The Method used is Siemens Advia Centaur currently standardized to a   Center of Disease Control and Prevention (CDC) certified reference   [address]. Samples containing fluorescein dye can produce falsely   elevated values when tested with the ADVIA Centaur Vitamin D Assay.    It is recommended that results in the toxic range, >100 ng/mL, be   retested 72 hours post fluorescein exposure. Follow-up and Dispositions    Return in about 3 months (around 4/18/2023) for for medicare wellness.

## 2023-01-18 NOTE — PROGRESS NOTES
Abbi Ledbetter is a 76 y.o. 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     Learning Assessment:     Learning Assessment 10/15/2015   PRIMARY LEARNER Patient   HIGHEST LEVEL OF EDUCATION - PRIMARY LEARNER  GRADUATED HIGH SCHOOL OR GED   BARRIERS PRIMARY LEARNER LANGUAGE   CO-LEARNER CAREGIVER Yes   CO-LEARNER NAME Azra   CO-LEARNER HIGHEST LEVEL OF EDUCATION GRADUATED HIGH SCHOOL OR GED   BARRIERS CO-LEARNER NONE   PRIMARY LANGUAGE ENGLISH   PRIMARY LANGUAGE CO-LEARNER ENGLISH    NEED No   LEARNER PREFERENCE PRIMARY LISTENING   LEARNER PREFERENCE CO-LEARNER LISTENING   LEARNING SPECIAL TOPICS no   ANSWERED BY self   RELATIONSHIP SELF     Depression Screening:     3 most recent PHQ Screens 2023   Little interest or pleasure in doing things Not at all   Feeling down, depressed, irritable, or hopeless Not at all   Total Score PHQ 2 0     Fall Risk Assessment:     Fall Risk Assessment, last 12 mths 2023   Able to walk? Yes   Fall in past 12 months? 0   Do you feel unsteady? 0   Are you worried about falling 0   Is the gait abnormal? -   Number of falls in past 12 months -   Fall with injury? -     Abuse Screening:     Abuse Screening Questionnaire 2023   Do you ever feel afraid of your partner? N   Are you in a relationship with someone who physically or mentally threatens you? N   Is it safe for you to go home?  Y     ADL Assessment:     ADL Assessment 2023   Feeding yourself No Help Needed   Getting from bed to chair No Help Needed   Getting dressed No Help Needed   Bathing or showering No Help Needed   Walk across the room (includes cane/walker) No Help Needed   Using the telphone Help Needed   Taking your medications Help Needed   Preparing meals No Help Needed   Managing money (expenses/bills) Help Needed   Moderately strenuous housework (laundry) Help Needed   Shopping for personal items (toiletries/medicines) Help Needed   Shopping for groceries Help Needed   Driving Help Needed   Climbing a flight of stairs Help Needed   Getting to places beyond walking distances No Help Needed        Coordination of Care Questionaire:   1. \"Have you been to the ER, urgent care clinic since your last visit? Hospitalized since your last visit? \" No    2. \"Have you seen or consulted any other health care providers outside of the [address] [place] [address] Mike since your last visit? \"  NO      3. For patients aged 39-70: Has the patient had a colonoscopy? No     If the patient is female:    4. For patients aged 41-77: Has the patient had a mammogram within the past 2 years? Yes  5. For patients aged 21-65: Has the patient had a pap smear? No    Advanced Directive:   1. Do you have an Advanced Directive? NO    2. Would you like information on Advanced Directives?  NO

## 2023-01-19 NOTE — PROGRESS NOTES
Informed pt's daughter in law of lab results and treatment and she wanted to clarify if patient should discontinue Asprin 81 Mg.

## 2023-01-19 NOTE — PROGRESS NOTES
Hemoglobin A1c 6.2 that is an increase from last time 5.9Patient need to be more adherent to lifestyle modifications otherwise we can consider taking medication  to help with preventing increasing A1c and becoming a diagnosis of diabetes  Cholesterol panel is within normal range with a good cholesterol being elevated which is good  Sodium potassium and chloride are all within normal limits    Slightly decrease in EGFR it is 51 with normal creatinine that is considered stage III kidney disease that has been stable since last time  Patient to avoid all NSAIDs like ibuprofen naproxen Motrin and Aleve    CBC is normal with normal hemoglobin and normal white cell count

## 2023-01-19 NOTE — PROGRESS NOTES
Hemoglobin A1c 6.2 that is an increase from last time 5.9Patient need to be more adherent to lifestyle modifications otherwise we can consider taking medication like metformin to help with

## 2023-01-22 NOTE — PROGRESS NOTES
She can continue Asprin low dose only once a day  it safe in kidney disease and it will prevent stroke and heart attacks

## 2023-02-02 ENCOUNTER — TELEPHONE (OUTPATIENT)
Dept: FAMILY MEDICINE CLINIC | Age: 68
End: 2023-02-02

## 2023-02-02 DIAGNOSIS — I10 ESSENTIAL HYPERTENSION: ICD-10-CM

## 2023-02-02 DIAGNOSIS — G45.9 TIA (TRANSIENT ISCHEMIC ATTACK): ICD-10-CM

## 2023-02-02 DIAGNOSIS — E78.00 HYPERCHOLESTEREMIA: ICD-10-CM

## 2023-02-02 DIAGNOSIS — K21.9 GASTROESOPHAGEAL REFLUX DISEASE WITHOUT ESOPHAGITIS: Primary | ICD-10-CM

## 2023-02-02 DIAGNOSIS — R55 SYNCOPE, UNSPECIFIED SYNCOPE TYPE: ICD-10-CM

## 2023-02-02 RX ORDER — GUAIFENESIN 100 MG/5ML
81 LIQUID (ML) ORAL DAILY
Qty: 90 TABLET | Refills: 3 | Status: SHIPPED | OUTPATIENT
Start: 2023-02-02 | End: 2023-05-03

## 2023-02-02 RX ORDER — OMEPRAZOLE 20 MG/1
20 CAPSULE, DELAYED RELEASE ORAL DAILY
Qty: 90 CAPSULE | Refills: 3 | Status: SHIPPED | OUTPATIENT
Start: 2023-02-02

## 2023-02-02 RX ORDER — OMEPRAZOLE 20 MG/1
20 CAPSULE, DELAYED RELEASE ORAL DAILY
Qty: 90 CAPSULE | Refills: 0 | Status: SHIPPED | OUTPATIENT
Start: 2023-02-02 | End: 2023-02-02 | Stop reason: SDUPTHER

## 2023-02-02 RX ORDER — ATORVASTATIN CALCIUM 40 MG/1
40 TABLET, FILM COATED ORAL
Qty: 90 TABLET | Refills: 0 | Status: SHIPPED | OUTPATIENT
Start: 2023-02-02 | End: 2023-02-02 | Stop reason: SDUPTHER

## 2023-02-02 RX ORDER — LOSARTAN POTASSIUM 50 MG/1
50 TABLET ORAL DAILY
Qty: 30 TABLET | Refills: 0 | Status: SHIPPED | OUTPATIENT
Start: 2023-02-02 | End: 2023-02-02 | Stop reason: SDUPTHER

## 2023-02-02 RX ORDER — LOSARTAN POTASSIUM 50 MG/1
50 TABLET ORAL DAILY
Qty: 90 TABLET | Refills: 3 | Status: SHIPPED | OUTPATIENT
Start: 2023-02-02 | End: 2023-05-03

## 2023-02-02 RX ORDER — GUAIFENESIN 100 MG/5ML
81 LIQUID (ML) ORAL DAILY
Qty: 90 TABLET | Refills: 3 | Status: SHIPPED | OUTPATIENT
Start: 2023-02-02 | End: 2023-02-02 | Stop reason: SDUPTHER

## 2023-02-02 RX ORDER — ATORVASTATIN CALCIUM 40 MG/1
40 TABLET, FILM COATED ORAL
Qty: 90 TABLET | Refills: 3 | Status: SHIPPED | OUTPATIENT
Start: 2023-02-02 | End: 2023-05-03

## 2023-02-02 NOTE — TELEPHONE ENCOUNTER
Please resend the Rx's to [facility] pharmacy listed.     Losartan; atorvastatin; aspirin; omeprazole

## 2023-03-21 ENCOUNTER — CLINICAL DOCUMENTATION (OUTPATIENT)
Dept: FAMILY MEDICINE CLINIC | Facility: CLINIC | Age: 68
End: 2023-03-21

## 2023-03-21 NOTE — PROGRESS NOTES
Patient was referred to GASTROENTEROLOGY:    General 01/20/2023  1:32 PM Ok Barry - -   Note    Referral, OV Notes, and Insurance information faxed to:  [facility] Phone: [phone]  Fax: [phone]     Requested office to contact patient to schedule. Request for referral status faxed. Patient was also referred to NEUROLOGY:    General 01/20/2023 12:24 PM Ok Barry - -   Note    Referral, OV Notes, LABS, and Insurance information faxed to:  [facility]  Phone: [phone]  Fax: [phone]     Requested office to contact patient to schedule. Request for referral status faxed.

## 2023-04-25 NOTE — TELEPHONE ENCOUNTER
Pt has 5 days left. Pt 's son is aware that PCP is out of the country.  Pt's son would like to know if there is another PCP that would be able to fill for the pt

## 2023-04-27 RX ORDER — ALBUTEROL SULFATE 90 UG/1
2 AEROSOL, METERED RESPIRATORY (INHALATION) EVERY 6 HOURS PRN
Qty: 18 G | Refills: 0 | Status: SHIPPED | OUTPATIENT
Start: 2023-04-27

## 2023-04-27 RX ORDER — OMEPRAZOLE 20 MG/1
20 CAPSULE, DELAYED RELEASE ORAL DAILY
Qty: 90 CAPSULE | Refills: 0 | Status: SHIPPED | OUTPATIENT
Start: 2023-04-27

## 2023-04-27 RX ORDER — ATORVASTATIN CALCIUM 40 MG/1
40 TABLET, FILM COATED ORAL DAILY
Qty: 90 TABLET | Refills: 0 | Status: SHIPPED | OUTPATIENT
Start: 2023-04-27

## 2023-04-27 RX ORDER — LOSARTAN POTASSIUM 50 MG/1
50 TABLET ORAL DAILY
Qty: 90 TABLET | Refills: 0 | Status: SHIPPED | OUTPATIENT
Start: 2023-04-27

## 2023-05-08 ENCOUNTER — OFFICE VISIT (OUTPATIENT)
Dept: FAMILY MEDICINE CLINIC | Facility: CLINIC | Age: 68
End: 2023-05-08

## 2023-05-08 VITALS
HEIGHT: 57 IN | SYSTOLIC BLOOD PRESSURE: 136 MMHG | OXYGEN SATURATION: 98 % | DIASTOLIC BLOOD PRESSURE: 64 MMHG | BODY MASS INDEX: 22.65 KG/M2 | RESPIRATION RATE: 17 BRPM | WEIGHT: 105 LBS | HEART RATE: 66 BPM | TEMPERATURE: 97.4 F

## 2023-05-08 DIAGNOSIS — K21.9 GASTRO-ESOPHAGEAL REFLUX DISEASE WITHOUT ESOPHAGITIS: ICD-10-CM

## 2023-05-08 DIAGNOSIS — M62.81 ABNORMAL GAIT DUE TO MUSCLE WEAKNESS: ICD-10-CM

## 2023-05-08 DIAGNOSIS — Z12.11 COLON CANCER SCREENING: ICD-10-CM

## 2023-05-08 DIAGNOSIS — Z12.31 ENCOUNTER FOR SCREENING MAMMOGRAM FOR MALIGNANT NEOPLASM OF BREAST: ICD-10-CM

## 2023-05-08 DIAGNOSIS — H54.40 BLINDNESS OF LEFT EYE WITH NORMAL VISION IN CONTRALATERAL EYE: ICD-10-CM

## 2023-05-08 DIAGNOSIS — N18.30 STAGE 3 CHRONIC KIDNEY DISEASE, UNSPECIFIED WHETHER STAGE 3A OR 3B CKD (HCC): ICD-10-CM

## 2023-05-08 DIAGNOSIS — Z99.89 USE OF CANE AS AMBULATORY AID: ICD-10-CM

## 2023-05-08 DIAGNOSIS — Z00.00 MEDICARE ANNUAL WELLNESS VISIT, SUBSEQUENT: Primary | ICD-10-CM

## 2023-05-08 DIAGNOSIS — E78.2 MIXED HYPERLIPIDEMIA: ICD-10-CM

## 2023-05-08 DIAGNOSIS — Z23 NEED FOR VACCINATION: ICD-10-CM

## 2023-05-08 DIAGNOSIS — R26.9 ABNORMAL GAIT DUE TO MUSCLE WEAKNESS: ICD-10-CM

## 2023-05-08 DIAGNOSIS — R41.3 MEMORY DEFICIT: ICD-10-CM

## 2023-05-08 DIAGNOSIS — I10 ESSENTIAL (PRIMARY) HYPERTENSION: ICD-10-CM

## 2023-05-08 DIAGNOSIS — Z91.81 AT HIGH RISK FOR INJURY RELATED TO FALL: ICD-10-CM

## 2023-05-08 DIAGNOSIS — Z78.0 MENOPAUSE: ICD-10-CM

## 2023-05-08 DIAGNOSIS — R73.03 PREDIABETES: ICD-10-CM

## 2023-05-08 LAB — HBA1C MFR BLD: 6 %

## 2023-05-08 PROCEDURE — 3075F SYST BP GE 130 - 139MM HG: CPT | Performed by: LEGAL MEDICINE

## 2023-05-08 PROCEDURE — 3078F DIAST BP <80 MM HG: CPT | Performed by: LEGAL MEDICINE

## 2023-05-08 PROCEDURE — 90471 IMMUNIZATION ADMIN: CPT | Performed by: LEGAL MEDICINE

## 2023-05-08 PROCEDURE — 1123F ACP DISCUSS/DSCN MKR DOCD: CPT | Performed by: LEGAL MEDICINE

## 2023-05-08 PROCEDURE — 83036 HEMOGLOBIN GLYCOSYLATED A1C: CPT | Performed by: LEGAL MEDICINE

## 2023-05-08 PROCEDURE — G0439 PPPS, SUBSEQ VISIT: HCPCS | Performed by: LEGAL MEDICINE

## 2023-05-08 PROCEDURE — 90677 PCV20 VACCINE IM: CPT | Performed by: LEGAL MEDICINE

## 2023-05-08 RX ORDER — OMEPRAZOLE 20 MG/1
20 CAPSULE, DELAYED RELEASE ORAL DAILY
Qty: 90 CAPSULE | Refills: 3 | Status: SHIPPED | OUTPATIENT
Start: 2023-05-08

## 2023-05-08 RX ORDER — LOSARTAN POTASSIUM 50 MG/1
50 TABLET ORAL DAILY
Qty: 90 TABLET | Refills: 1 | Status: SHIPPED | OUTPATIENT
Start: 2023-05-08

## 2023-05-08 RX ORDER — ATORVASTATIN CALCIUM 40 MG/1
40 TABLET, FILM COATED ORAL DAILY
Qty: 90 TABLET | Refills: 3 | Status: SHIPPED | OUTPATIENT
Start: 2023-05-08

## 2023-05-08 SDOH — ECONOMIC STABILITY: INCOME INSECURITY: HOW HARD IS IT FOR YOU TO PAY FOR THE VERY BASICS LIKE FOOD, HOUSING, MEDICAL CARE, AND HEATING?: NOT VERY HARD

## 2023-05-08 SDOH — ECONOMIC STABILITY: FOOD INSECURITY: WITHIN THE PAST 12 MONTHS, THE FOOD YOU BOUGHT JUST DIDN'T LAST AND YOU DIDN'T HAVE MONEY TO GET MORE.: NEVER TRUE

## 2023-05-08 SDOH — ECONOMIC STABILITY: FOOD INSECURITY: WITHIN THE PAST 12 MONTHS, YOU WORRIED THAT YOUR FOOD WOULD RUN OUT BEFORE YOU GOT MONEY TO BUY MORE.: NEVER TRUE

## 2023-05-08 SDOH — ECONOMIC STABILITY: HOUSING INSECURITY
IN THE LAST 12 MONTHS, WAS THERE A TIME WHEN YOU DID NOT HAVE A STEADY PLACE TO SLEEP OR SLEPT IN A SHELTER (INCLUDING NOW)?: NO

## 2023-05-08 ASSESSMENT — PATIENT HEALTH QUESTIONNAIRE - PHQ9
SUM OF ALL RESPONSES TO PHQ9 QUESTIONS 1 & 2: 0
SUM OF ALL RESPONSES TO PHQ QUESTIONS 1-9: 0
1. LITTLE INTEREST OR PLEASURE IN DOING THINGS: 0
SUM OF ALL RESPONSES TO PHQ9 QUESTIONS 1 & 2: 0
SUM OF ALL RESPONSES TO PHQ QUESTIONS 1-9: 0
2. FEELING DOWN, DEPRESSED OR HOPELESS: 0
SUM OF ALL RESPONSES TO PHQ QUESTIONS 1-9: 0
2. FEELING DOWN, DEPRESSED OR HOPELESS: 0
SUM OF ALL RESPONSES TO PHQ QUESTIONS 1-9: 0
SUM OF ALL RESPONSES TO PHQ QUESTIONS 1-9: 0
1. LITTLE INTEREST OR PLEASURE IN DOING THINGS: 0
SUM OF ALL RESPONSES TO PHQ QUESTIONS 1-9: 0

## 2023-05-08 ASSESSMENT — LIFESTYLE VARIABLES
HOW MANY STANDARD DRINKS CONTAINING ALCOHOL DO YOU HAVE ON A TYPICAL DAY: PATIENT DOES NOT DRINK
HOW OFTEN DO YOU HAVE A DRINK CONTAINING ALCOHOL: NEVER
HOW MANY STANDARD DRINKS CONTAINING ALCOHOL DO YOU HAVE ON A TYPICAL DAY: PATIENT DOES NOT DRINK

## 2023-05-08 ASSESSMENT — ANXIETY QUESTIONNAIRES
2. NOT BEING ABLE TO STOP OR CONTROL WORRYING: 0
6. BECOMING EASILY ANNOYED OR IRRITABLE: 0
GAD7 TOTAL SCORE: 0
4. TROUBLE RELAXING: 0
IF YOU CHECKED OFF ANY PROBLEMS ON THIS QUESTIONNAIRE, HOW DIFFICULT HAVE THESE PROBLEMS MADE IT FOR YOU TO DO YOUR WORK, TAKE CARE OF THINGS AT HOME, OR GET ALONG WITH OTHER PEOPLE: NOT DIFFICULT AT ALL
1. FEELING NERVOUS, ANXIOUS, OR ON EDGE: 0
7. FEELING AFRAID AS IF SOMETHING AWFUL MIGHT HAPPEN: 0
3. WORRYING TOO MUCH ABOUT DIFFERENT THINGS: 0
5. BEING SO RESTLESS THAT IT IS HARD TO SIT STILL: 0

## 2023-05-08 NOTE — PROGRESS NOTES
Medicare Annual Wellness Visit    Sonal Dobbs is here for Medicare AWV  Patient is here today accompanied by her son Valente   Patient has been experiencing increasing weakness in her lower legs, using a cane to walk  (Family noticed deficit in her memory  She had a recent fall on April 8 she was taken to emergency room        Assessment & Plan   Medicare annual wellness visit, subsequent  Gastro-esophageal reflux disease without esophagitis  -     omeprazole (PRILOSEC) 20 MG delayed release capsule; Take 1 capsule by mouth daily, Disp-90 capsule, R-3Normal  Essential (primary) hypertension  Blood pressures well controlled on current medications  -     losartan (COZAAR) 50 MG tablet; Take 1 tablet by mouth daily, Disp-90 tablet, R-1Normal  Stage 3 chronic kidney disease, unspecified whether stage 3a or 3b CKD ([facility] [address]  Encounter for screening mammogram for malignant neoplasm of breast  -     [facility] THAIS DIGITAL SCREEN BILATERAL; Future  Menopause  -     DEXA BONE DENSITY AXIAL SKELETON; Future  Colon cancer screening  -     External Referral To Gastroenterology  Need for vaccination  Vaccination was given with no complications  -     Pneumococcal, PCV20, PREVNAR 21, (age 25 yrs+), IM, PF  Mixed hyperlipidemia  -     atorvastatin (LIPITOR) 40 MG tablet;  Take 1 tablet by mouth daily, Disp-90 tablet, R-3Normal  Prediabetes  -     AMB POC HEMOGLOBIN A1C  Hemoglobin A1c is 6.0 compared to 6.2 previously advised to be adherent to low carbohydrate and low sugar diet  Memory deficit  -     External Referral To Neurology  Blindness of left eye with normal vision in contralateral eye  At high risk for injury related to fall  Use of cane as ambulatory aid  Abnormal gait due to muscle weakness      Recommendations for Preventive Services Due: see orders and patient instructions/AVS.  Recommended screening schedule for the next 5-10 years is provided to the patient in written form: see Patient Instructions/AVS.     Return in

## 2023-05-08 NOTE — PROGRESS NOTES
Lars Mccracken is a 76 y.o. female (: 1955) presenting to address:    Chief Complaint   Patient presents with    Medicare AWV       Vitals:    23 1142   BP: 136/64   Pulse: 66   Resp: 17   Temp: 97.4 °F (36.3 °C)   SpO2: 98%       Coordination of Care Questionaire:   1. \"Have you been to the ER, urgent care clinic since your last visit? Hospitalized since your last visit? \"  [facility] ER for fall/    2. \"Have you seen or consulted any other health care providers outside of the [address] [place] [address] since your last visit? \" No     3. For patients aged 39-70: Has the patient had a colonoscopy / FIT/ Cologuard? No      If the patient is female:    4. For patients aged 41-77: Has the patient had a mammogram within the past 2 years? No      5. For patients aged 21-65: Has the patient had a pap smear? No    Advanced Directive:   1. Do you have an Advanced Directive? Yes    2. Would you like information on Advanced Directives?  No    Immunizations Administered       Name Date Dose Route    Pneumococcal, PCV20, PREVNAR 21, (age 18y+), IM, 0.5mL 2023 0.5 mL Intramuscular    Site: Deltoid- Right    Lot: [id number]    NDC: 1716-4190-95

## 2023-05-08 NOTE — PATIENT INSTRUCTIONS
towel, or swimming. Always wear a seat belt when traveling in a car. Always wear a helmet when riding a bicycle or motorcycle.

## 2023-08-17 DIAGNOSIS — E78.2 MIXED HYPERLIPIDEMIA: ICD-10-CM

## 2023-08-17 DIAGNOSIS — K21.9 GASTRO-ESOPHAGEAL REFLUX DISEASE WITHOUT ESOPHAGITIS: ICD-10-CM

## 2023-08-17 DIAGNOSIS — I10 ESSENTIAL (PRIMARY) HYPERTENSION: ICD-10-CM

## 2023-08-17 NOTE — TELEPHONE ENCOUNTER
Spoke w/pt's POA April Breanna Gabriel pt has refills on file at [facility]; April will contact [facility] pharmacy to request the refills.

## 2023-08-17 NOTE — TELEPHONE ENCOUNTER
Pt's Power of  called with Rx Refill Request.  Requested Prescriptions     Pending Prescriptions Disp Refills    atorvastatin (LIPITOR) 40 MG tablet 90 tablet 3     Sig: Take 1 tablet by mouth daily    losartan (COZAAR) 50 MG tablet 90 tablet 1     Sig: Take 1 tablet by mouth daily    omeprazole (PRILOSEC) 20 MG delayed release capsule 90 capsule 3     Sig: Take 1 capsule by mouth daily

## 2023-08-22 RX ORDER — ATORVASTATIN CALCIUM 40 MG/1
40 TABLET, FILM COATED ORAL DAILY
Qty: 90 TABLET | Refills: 3 | OUTPATIENT
Start: 2023-08-22

## 2023-08-22 RX ORDER — LOSARTAN POTASSIUM 50 MG/1
50 TABLET ORAL DAILY
Qty: 90 TABLET | Refills: 1 | OUTPATIENT
Start: 2023-08-22

## 2023-08-22 RX ORDER — OMEPRAZOLE 20 MG/1
20 CAPSULE, DELAYED RELEASE ORAL DAILY
Qty: 90 CAPSULE | Refills: 3 | OUTPATIENT
Start: 2023-08-22

## 2023-11-13 DIAGNOSIS — I10 ESSENTIAL (PRIMARY) HYPERTENSION: ICD-10-CM

## 2023-11-13 DIAGNOSIS — K21.9 GASTRO-ESOPHAGEAL REFLUX DISEASE WITHOUT ESOPHAGITIS: ICD-10-CM

## 2023-11-13 DIAGNOSIS — E78.2 MIXED HYPERLIPIDEMIA: ICD-10-CM

## 2023-11-13 NOTE — TELEPHONE ENCOUNTER
Pt has future appt    Future Appointments   Date Time Provider [address]   11/27/2023 11:00 AM DO MELVA Michael BS AMB

## 2023-11-14 RX ORDER — ATORVASTATIN CALCIUM 40 MG/1
40 TABLET, FILM COATED ORAL DAILY
Qty: 90 TABLET | Refills: 3 | Status: SHIPPED | OUTPATIENT
Start: 2023-11-14

## 2023-11-14 RX ORDER — LOSARTAN POTASSIUM 50 MG/1
50 TABLET ORAL DAILY
Qty: 90 TABLET | Refills: 1 | Status: SHIPPED | OUTPATIENT
Start: 2023-11-14

## 2023-11-14 RX ORDER — ALBUTEROL SULFATE 90 UG/1
2 AEROSOL, METERED RESPIRATORY (INHALATION) EVERY 6 HOURS PRN
Qty: 18 G | Refills: 0 | Status: SHIPPED | OUTPATIENT
Start: 2023-11-14

## 2023-11-14 RX ORDER — OMEPRAZOLE 20 MG/1
20 CAPSULE, DELAYED RELEASE ORAL DAILY
Qty: 90 CAPSULE | Refills: 3 | Status: SHIPPED | OUTPATIENT
Start: 2023-11-14

## 2023-11-27 ENCOUNTER — OFFICE VISIT (OUTPATIENT)
Dept: FAMILY MEDICINE CLINIC | Facility: CLINIC | Age: 68
End: 2023-11-27
Payer: MEDICARE

## 2023-11-27 ENCOUNTER — HOSPITAL ENCOUNTER (OUTPATIENT)
Facility: HOSPITAL | Age: 68
Setting detail: SPECIMEN
Discharge: HOME | End: 2023-11-30

## 2023-11-27 VITALS
SYSTOLIC BLOOD PRESSURE: 122 MMHG | HEART RATE: 67 BPM | WEIGHT: 110 LBS | TEMPERATURE: 97.2 F | HEIGHT: 57 IN | RESPIRATION RATE: 14 BRPM | OXYGEN SATURATION: 99 % | DIASTOLIC BLOOD PRESSURE: 62 MMHG | BODY MASS INDEX: 23.73 KG/M2

## 2023-11-27 DIAGNOSIS — N32.81 OVERACTIVE BLADDER: ICD-10-CM

## 2023-11-27 DIAGNOSIS — N18.30 STAGE 3 CHRONIC KIDNEY DISEASE, UNSPECIFIED WHETHER STAGE 3A OR 3B CKD (HCC): ICD-10-CM

## 2023-11-27 DIAGNOSIS — Z12.11 ENCOUNTER FOR SCREENING FOR MALIGNANT NEOPLASM OF COLON: ICD-10-CM

## 2023-11-27 DIAGNOSIS — E78.2 MIXED HYPERLIPIDEMIA: ICD-10-CM

## 2023-11-27 DIAGNOSIS — Z12.31 ENCOUNTER FOR SCREENING MAMMOGRAM FOR BREAST CANCER: ICD-10-CM

## 2023-11-27 DIAGNOSIS — R41.3 MEMORY DEFICIT: ICD-10-CM

## 2023-11-27 DIAGNOSIS — I10 ESSENTIAL (PRIMARY) HYPERTENSION: ICD-10-CM

## 2023-11-27 DIAGNOSIS — Q21.12 PFO (PATENT FORAMEN OVALE): ICD-10-CM

## 2023-11-27 DIAGNOSIS — K21.9 GASTRO-ESOPHAGEAL REFLUX DISEASE WITHOUT ESOPHAGITIS: ICD-10-CM

## 2023-11-27 DIAGNOSIS — I10 ESSENTIAL (PRIMARY) HYPERTENSION: Primary | ICD-10-CM

## 2023-11-27 DIAGNOSIS — R73.03 PREDIABETES: ICD-10-CM

## 2023-11-27 LAB
ALBUMIN SERPL-MCNC: 4.2 G/DL (ref 3.4–5)
ALBUMIN/GLOB SERPL: 1.2 (ref 0.8–1.7)
ALP SERPL-CCNC: 136 U/L (ref 45–117)
ALT SERPL-CCNC: 40 U/L (ref 13–56)
ANION GAP SERPL CALC-SCNC: 6 MMOL/L (ref 3–18)
APPEARANCE UR: CLEAR
AST SERPL-CCNC: 28 U/L (ref 10–38)
BASOPHILS # BLD: 0.1 K/UL (ref 0–0.1)
BASOPHILS NFR BLD: 1 % (ref 0–2)
BILIRUB SERPL-MCNC: 0.4 MG/DL (ref 0.2–1)
BILIRUB UR QL: NEGATIVE
BUN SERPL-MCNC: 16 MG/DL (ref 7–18)
BUN/CREAT SERPL: 13 (ref 12–20)
CALCIUM SERPL-MCNC: 9.2 MG/DL (ref 8.5–10.1)
CHLORIDE SERPL-SCNC: 107 MMOL/L (ref 100–111)
CHOLEST SERPL-MCNC: 188 MG/DL
CO2 SERPL-SCNC: 27 MMOL/L (ref 21–32)
COLOR UR: YELLOW
CREAT SERPL-MCNC: 1.26 MG/DL (ref 0.6–1.3)
CREAT UR-MCNC: 28 MG/DL (ref 30–125)
DIFFERENTIAL METHOD BLD: ABNORMAL
EOSINOPHIL # BLD: 0.2 K/UL (ref 0–0.4)
EOSINOPHIL NFR BLD: 2 % (ref 0–5)
ERYTHROCYTE [DISTWIDTH] IN BLOOD BY AUTOMATED COUNT: 12.2 % (ref 11.6–14.5)
EST. AVERAGE GLUCOSE BLD GHB EST-MCNC: 126 MG/DL
GLOBULIN SER CALC-MCNC: 3.6 G/DL (ref 2–4)
GLUCOSE SERPL-MCNC: 107 MG/DL (ref 74–99)
GLUCOSE UR STRIP.AUTO-MCNC: NEGATIVE MG/DL
HBA1C MFR BLD: 6 % (ref 4.2–5.6)
HCT VFR BLD AUTO: 41.2 % (ref 35–45)
HDLC SERPL-MCNC: 100 MG/DL (ref 40–60)
HDLC SERPL: 1.9 (ref 0–5)
HGB BLD-MCNC: 13.2 G/DL (ref 12–16)
HGB UR QL STRIP: NEGATIVE
IMM GRANULOCYTES # BLD AUTO: 0 K/UL (ref 0–0.04)
IMM GRANULOCYTES NFR BLD AUTO: 0 % (ref 0–0.5)
KETONES UR QL STRIP.AUTO: NEGATIVE MG/DL
LDLC SERPL CALC-MCNC: 75.2 MG/DL (ref 0–100)
LEUKOCYTE ESTERASE UR QL STRIP.AUTO: NEGATIVE
LIPID PANEL: ABNORMAL
LYMPHOCYTES # BLD: 2.4 K/UL (ref 0.9–3.6)
LYMPHOCYTES NFR BLD: 36 % (ref 21–52)
MCH RBC QN AUTO: 30.1 PG (ref 24–34)
MCHC RBC AUTO-ENTMCNC: 32 G/DL (ref 31–37)
MCV RBC AUTO: 93.8 FL (ref 78–100)
MICROALBUMIN UR-MCNC: <0.5 MG/DL (ref 0–3)
MICROALBUMIN/CREAT UR-RTO: ABNORMAL MG/G (ref 0–30)
MONOCYTES # BLD: 0.5 K/UL (ref 0.05–1.2)
MONOCYTES NFR BLD: 8 % (ref 3–10)
NEUTS SEG # BLD: 3.4 K/UL (ref 1.8–8)
NEUTS SEG NFR BLD: 52 % (ref 40–73)
NITRITE UR QL STRIP.AUTO: NEGATIVE
NRBC # BLD: 0 K/UL (ref 0–0.01)
NRBC BLD-RTO: 0 PER 100 WBC
PH UR STRIP: 6.5 (ref 5–8)
PLATELET # BLD AUTO: 232 K/UL (ref 135–420)
PMV BLD AUTO: 11.9 FL (ref 9.2–11.8)
POTASSIUM SERPL-SCNC: 3.9 MMOL/L (ref 3.5–5.5)
PROT SERPL-MCNC: 7.8 G/DL (ref 6.4–8.2)
PROT UR STRIP-MCNC: NEGATIVE MG/DL
RBC # BLD AUTO: 4.39 M/UL (ref 4.2–5.3)
SODIUM SERPL-SCNC: 140 MMOL/L (ref 136–145)
SP GR UR REFRACTOMETRY: 1.01 (ref 1–1.03)
TRIGL SERPL-MCNC: 64 MG/DL
UROBILINOGEN UR QL STRIP.AUTO: 0.2 EU/DL (ref 0.2–1)
VLDLC SERPL CALC-MCNC: 12.8 MG/DL
WBC # BLD AUTO: 6.6 K/UL (ref 4.6–13.2)

## 2023-11-27 PROCEDURE — 36415 COLL VENOUS BLD VENIPUNCTURE: CPT

## 2023-11-27 PROCEDURE — G0008 ADMIN INFLUENZA VIRUS VAC: HCPCS | Performed by: STUDENT IN AN ORGANIZED HEALTH CARE EDUCATION/TRAINING PROGRAM

## 2023-11-27 PROCEDURE — 99214 OFFICE O/P EST MOD 30 MIN: CPT | Performed by: STUDENT IN AN ORGANIZED HEALTH CARE EDUCATION/TRAINING PROGRAM

## 2023-11-27 PROCEDURE — 81003 URINALYSIS AUTO W/O SCOPE: CPT

## 2023-11-27 PROCEDURE — 83036 HEMOGLOBIN GLYCOSYLATED A1C: CPT

## 2023-11-27 PROCEDURE — 80053 COMPREHEN METABOLIC PANEL: CPT

## 2023-11-27 PROCEDURE — 1123F ACP DISCUSS/DSCN MKR DOCD: CPT | Performed by: STUDENT IN AN ORGANIZED HEALTH CARE EDUCATION/TRAINING PROGRAM

## 2023-11-27 PROCEDURE — 82043 UR ALBUMIN QUANTITATIVE: CPT

## 2023-11-27 PROCEDURE — 3074F SYST BP LT 130 MM HG: CPT | Performed by: STUDENT IN AN ORGANIZED HEALTH CARE EDUCATION/TRAINING PROGRAM

## 2023-11-27 PROCEDURE — 80061 LIPID PANEL: CPT

## 2023-11-27 PROCEDURE — 85025 COMPLETE CBC W/AUTO DIFF WBC: CPT

## 2023-11-27 PROCEDURE — 3078F DIAST BP <80 MM HG: CPT | Performed by: STUDENT IN AN ORGANIZED HEALTH CARE EDUCATION/TRAINING PROGRAM

## 2023-11-27 PROCEDURE — 82570 ASSAY OF URINE CREATININE: CPT

## 2023-11-27 PROCEDURE — 90694 VACC AIIV4 NO PRSRV 0.5ML IM: CPT | Performed by: STUDENT IN AN ORGANIZED HEALTH CARE EDUCATION/TRAINING PROGRAM

## 2023-11-27 ASSESSMENT — ENCOUNTER SYMPTOMS
EYE PAIN: 0
BACK PAIN: 0
CONSTIPATION: 0
COUGH: 0
ABDOMINAL PAIN: 0
VOMITING: 0
NAUSEA: 0
SORE THROAT: 0
DIARRHEA: 0
CHEST TIGHTNESS: 0
RHINORRHEA: 0
SHORTNESS OF BREATH: 0

## 2024-02-23 DIAGNOSIS — K21.9 GASTRO-ESOPHAGEAL REFLUX DISEASE WITHOUT ESOPHAGITIS: ICD-10-CM

## 2024-02-23 DIAGNOSIS — E78.2 MIXED HYPERLIPIDEMIA: ICD-10-CM

## 2024-02-23 DIAGNOSIS — I10 ESSENTIAL (PRIMARY) HYPERTENSION: ICD-10-CM

## 2024-02-23 DIAGNOSIS — N32.81 OVERACTIVE BLADDER: ICD-10-CM

## 2024-02-23 RX ORDER — OMEPRAZOLE 20 MG/1
20 CAPSULE, DELAYED RELEASE ORAL DAILY
Qty: 90 CAPSULE | Refills: 3 | Status: SHIPPED | OUTPATIENT
Start: 2024-02-23

## 2024-02-23 RX ORDER — ATORVASTATIN CALCIUM 40 MG/1
40 TABLET, FILM COATED ORAL DAILY
Qty: 90 TABLET | Refills: 3 | Status: SHIPPED | OUTPATIENT
Start: 2024-02-23

## 2024-02-23 RX ORDER — LOSARTAN POTASSIUM 50 MG/1
50 TABLET ORAL DAILY
Qty: 90 TABLET | Refills: 1 | Status: SHIPPED | OUTPATIENT
Start: 2024-02-23

## 2024-02-23 NOTE — TELEPHONE ENCOUNTER
Pt's daughter called for med refills; explained there are refills on file; [facility] is telling family no refills on file; pt has dementia per family and requesting the refills.    
Corona Alexander

## 2024-02-26 ASSESSMENT — ENCOUNTER SYMPTOMS
EYE PAIN: 0
SORE THROAT: 0
CHEST TIGHTNESS: 0
BACK PAIN: 0
DIARRHEA: 0
ABDOMINAL PAIN: 0
NAUSEA: 0
COUGH: 0
SHORTNESS OF BREATH: 0
CONSTIPATION: 0
RHINORRHEA: 0
VOMITING: 0

## 2024-02-27 ENCOUNTER — OFFICE VISIT (OUTPATIENT)
Dept: FAMILY MEDICINE CLINIC | Facility: CLINIC | Age: 69
End: 2024-02-27

## 2024-02-27 VITALS
HEART RATE: 80 BPM | BODY MASS INDEX: 24.08 KG/M2 | HEIGHT: 57 IN | TEMPERATURE: 97.2 F | WEIGHT: 111.6 LBS | RESPIRATION RATE: 14 BRPM | SYSTOLIC BLOOD PRESSURE: 120 MMHG | DIASTOLIC BLOOD PRESSURE: 60 MMHG | OXYGEN SATURATION: 97 %

## 2024-02-27 DIAGNOSIS — I10 ESSENTIAL (PRIMARY) HYPERTENSION: ICD-10-CM

## 2024-02-27 DIAGNOSIS — N18.30 STAGE 3 CHRONIC KIDNEY DISEASE, UNSPECIFIED WHETHER STAGE 3A OR 3B CKD (HCC): ICD-10-CM

## 2024-02-27 DIAGNOSIS — F01.B0 MODERATE VASCULAR DEMENTIA WITHOUT BEHAVIORAL DISTURBANCE, PSYCHOTIC DISTURBANCE, MOOD DISTURBANCE, OR ANXIETY (HCC): Primary | ICD-10-CM

## 2024-02-27 DIAGNOSIS — E78.2 MIXED HYPERLIPIDEMIA: ICD-10-CM

## 2024-02-27 PROCEDURE — 1123F ACP DISCUSS/DSCN MKR DOCD: CPT | Performed by: STUDENT IN AN ORGANIZED HEALTH CARE EDUCATION/TRAINING PROGRAM

## 2024-02-27 PROCEDURE — 3074F SYST BP LT 130 MM HG: CPT | Performed by: STUDENT IN AN ORGANIZED HEALTH CARE EDUCATION/TRAINING PROGRAM

## 2024-02-27 PROCEDURE — 3078F DIAST BP <80 MM HG: CPT | Performed by: STUDENT IN AN ORGANIZED HEALTH CARE EDUCATION/TRAINING PROGRAM

## 2024-02-27 PROCEDURE — 99214 OFFICE O/P EST MOD 30 MIN: CPT | Performed by: STUDENT IN AN ORGANIZED HEALTH CARE EDUCATION/TRAINING PROGRAM

## 2024-02-27 RX ORDER — LOSARTAN POTASSIUM 50 MG/1
50 TABLET ORAL DAILY
Qty: 90 TABLET | Refills: 3 | Status: SHIPPED | OUTPATIENT
Start: 2024-02-27

## 2024-02-27 RX ORDER — MEMANTINE HYDROCHLORIDE 5 MG/1
5 TABLET ORAL DAILY
Qty: 90 TABLET | Refills: 1 | Status: SHIPPED | OUTPATIENT
Start: 2024-02-27

## 2024-02-27 ASSESSMENT — PATIENT HEALTH QUESTIONNAIRE - PHQ9
1. LITTLE INTEREST OR PLEASURE IN DOING THINGS: 0
SUM OF ALL RESPONSES TO PHQ QUESTIONS 1-9: 0
2. FEELING DOWN, DEPRESSED OR HOPELESS: 0
SUM OF ALL RESPONSES TO PHQ9 QUESTIONS 1 & 2: 0
SUM OF ALL RESPONSES TO PHQ QUESTIONS 1-9: 0

## 2024-02-27 NOTE — PATIENT INSTRUCTIONS
Silvio Morataya MD  Cardiology   [address] [place] [address] [address] [place] [address] [phone]     Colonoscopy  [facility]  Phone: [phone]  Fax: [phone]    Mammogram  [address] [place] [address]

## 2024-02-27 NOTE — PROGRESS NOTES
Milena Donovan is a 69 y.o. female (: 1955) presenting to address:    Chief Complaint   Patient presents with    Hypertension       Vitals:    24 1106   BP: 120/60   Pulse: 80   Resp: 14   Temp: 97.2 °F (36.2 °C)   SpO2: 97%       \"Have you been to the ER, urgent care clinic since your last visit?  Hospitalized since your last visit?\"    NO    “Have you seen or consulted any other health care providers outside of [facility] since your last visit?”    NO    “Have you had a colorectal cancer screening such as a colonoscopy/FIT/Cologuard?    NO     Have you had a mammogram?”   NO          
Outpatient Medications:     memantine (NAMENDA) 5 MG tablet, Take 1 tablet by mouth daily, Disp: 90 tablet, Rfl: 1    losartan (COZAAR) 50 MG tablet, Take 1 tablet by mouth daily, Disp: 90 tablet, Rfl: 3    atorvastatin (LIPITOR) 40 MG tablet, Take 1 tablet by mouth daily, Disp: 90 tablet, Rfl: 3    mirabegron (MYRBETRIQ) 25 MG TB24, Take 1 tablet by mouth daily For overactive bladder, Disp: 30 tablet, Rfl: 2    omeprazole (PRILOSEC) 20 MG delayed release capsule, Take 1 capsule by mouth daily, Disp: 90 capsule, Rfl: 3    albuterol sulfate HFA (PROVENTIL;VENTOLIN;PROAIR) 108 (90 Base) MCG/ACT inhaler, Inhale 2 puffs into the lungs every 6 hours as needed for Wheezing or Shortness of Breath, Disp: 18 g, Rfl: 0    aspirin 81 MG chewable tablet, Take 1 tablet by mouth daily, Disp: , Rfl:     Cholecalciferol 50 MCG (2000 UT) CAPS, Take 1 capsule by mouth daily, Disp: , Rfl:       Review of Systems   Constitutional:  Negative for appetite change, chills, fatigue, fever and unexpected weight change.   HENT:  Negative for congestion, rhinorrhea and sore throat.    Eyes:  Negative for pain.   Respiratory:  Negative for cough, chest tightness and shortness of breath.    Cardiovascular:  Negative for chest pain, palpitations and leg swelling.   Gastrointestinal:  Negative for abdominal pain, constipation, diarrhea, nausea and vomiting.   Genitourinary:  Negative for dysuria and frequency.   Musculoskeletal:  Negative for arthralgias, back pain and myalgias.   Skin:  Negative for rash and wound.   Neurological:  Negative for dizziness and weakness.       Physical Exam  Constitutional:       General: She is not in acute distress.     Appearance: Normal appearance. She is not ill-appearing or toxic-appearing.   HENT:      Head: Normocephalic and atraumatic.      Mouth/Throat:      Pharynx: No posterior oropharyngeal erythema.   Cardiovascular:      Rate and Rhythm: Normal rate and regular rhythm.      Pulses: Normal pulses.

## 2024-03-19 NOTE — PROGRESS NOTES
Chief Complaint   Patient presents with    Cough    Wheezing     1. Have you been to the ER, urgent care clinic since your last visit? Hospitalized since your last visit? No    2. Have you seen or consulted any other health care providers outside of the [address] [place] [address] since your last visit? Include any pap smears or colon screening.  No Second prescription of phentermine

## 2024-03-26 ENCOUNTER — OFFICE VISIT (OUTPATIENT)
Dept: FAMILY MEDICINE CLINIC | Facility: CLINIC | Age: 69
End: 2024-03-26

## 2024-03-26 VITALS
RESPIRATION RATE: 18 BRPM | OXYGEN SATURATION: 99 % | DIASTOLIC BLOOD PRESSURE: 90 MMHG | HEIGHT: 57 IN | HEART RATE: 74 BPM | SYSTOLIC BLOOD PRESSURE: 130 MMHG | BODY MASS INDEX: 23.82 KG/M2 | TEMPERATURE: 97.3 F | WEIGHT: 110.4 LBS

## 2024-03-26 DIAGNOSIS — J30.2 SEASONAL ALLERGIC RHINITIS DUE TO FUNGAL SPORES: Primary | ICD-10-CM

## 2024-03-26 DIAGNOSIS — I10 ESSENTIAL (PRIMARY) HYPERTENSION: ICD-10-CM

## 2024-03-26 DIAGNOSIS — F01.B0 MODERATE VASCULAR DEMENTIA WITHOUT BEHAVIORAL DISTURBANCE, PSYCHOTIC DISTURBANCE, MOOD DISTURBANCE, OR ANXIETY (HCC): ICD-10-CM

## 2024-03-26 DIAGNOSIS — N18.30 STAGE 3 CHRONIC KIDNEY DISEASE, UNSPECIFIED WHETHER STAGE 3A OR 3B CKD (HCC): ICD-10-CM

## 2024-03-26 PROBLEM — G30.9 ALZHEIMER'S DISEASE, UNSPECIFIED (CODE) (HCC): Status: ACTIVE | Noted: 2024-03-26

## 2024-03-26 PROCEDURE — 3075F SYST BP GE 130 - 139MM HG: CPT | Performed by: STUDENT IN AN ORGANIZED HEALTH CARE EDUCATION/TRAINING PROGRAM

## 2024-03-26 PROCEDURE — 99214 OFFICE O/P EST MOD 30 MIN: CPT | Performed by: STUDENT IN AN ORGANIZED HEALTH CARE EDUCATION/TRAINING PROGRAM

## 2024-03-26 PROCEDURE — 3078F DIAST BP <80 MM HG: CPT | Performed by: STUDENT IN AN ORGANIZED HEALTH CARE EDUCATION/TRAINING PROGRAM

## 2024-03-26 PROCEDURE — 1123F ACP DISCUSS/DSCN MKR DOCD: CPT | Performed by: STUDENT IN AN ORGANIZED HEALTH CARE EDUCATION/TRAINING PROGRAM

## 2024-03-26 RX ORDER — CETIRIZINE HYDROCHLORIDE 10 MG/1
10 TABLET ORAL DAILY
Qty: 90 TABLET | Refills: 1 | Status: SHIPPED | OUTPATIENT
Start: 2024-03-26

## 2024-03-26 RX ORDER — ALBUTEROL SULFATE 90 UG/1
2 AEROSOL, METERED RESPIRATORY (INHALATION) EVERY 6 HOURS PRN
Qty: 18 G | Refills: 0 | Status: SHIPPED | OUTPATIENT
Start: 2024-03-26

## 2024-03-26 RX ORDER — MEMANTINE HYDROCHLORIDE 10 MG/1
10 TABLET ORAL DAILY
Qty: 90 TABLET | Refills: 3 | Status: SHIPPED | OUTPATIENT
Start: 2024-03-26

## 2024-03-26 ASSESSMENT — ENCOUNTER SYMPTOMS
BACK PAIN: 0
CONSTIPATION: 0
SORE THROAT: 0
NAUSEA: 0
EYE PAIN: 0
ABDOMINAL PAIN: 0
COUGH: 0
VOMITING: 0
DIARRHEA: 0
SHORTNESS OF BREATH: 0
RHINORRHEA: 0
CHEST TIGHTNESS: 0

## 2024-03-26 NOTE — PROGRESS NOTES
Milena Donovan is a 69 y.o. female (: 1955) presenting to address:    Chief Complaint   Patient presents with    Medication Check       Vitals:    24 1331   BP: (!) 158/76   Pulse: 74   Resp: 18   Temp: 97.3 °F (36.3 °C)   SpO2: 99%       \"Have you been to the ER, urgent care clinic since your last visit?  Hospitalized since your last visit?\"    NO    “Have you seen or consulted any other health care providers outside of [facility] since your last visit?”    NO    “Have you had a colorectal cancer screening such as a colonoscopy/FIT/Cologuard?    NO    Date of last Colonoscopy: 2017  No cologuard on file  No FIT/FOBT on file   No flexible sigmoidoscopy on file        Have you had a mammogram?”   NO    Date of last Mammogram: 10/23/2018           
memory deficit and issues, no issues with sleeping, trouble at night or in the morning.  No issues with Namenda, increased to 10 mg per    # Overactive bladder -patient with multiple years of increased urinary urgency and frequency.  No UTI symptoms.  No leaking or incontinence.  Will check UA today to rule out UTI.  Declines pelvic floor therapy.  Continue trial of Myrbetriq    #HM -referral today for colonoscopy, history of adenomas, and mammogram.  Flu shot UTD recommend COVID booster at local pharmacy.    Plan of care has been discussed with the patient, he agrees to the plan and verbalized understanding.  All his questions were answered  More than 50% of the time spent in this visit was counseling the patient about  illness and treatment options     Follow-up in 3 months.      Rocky Worley, DO  Family Medicine  [facility]     PLEASE NOTE:   This document has been produced using voice recognition software. Unrecognized errors in transcription may be present.

## 2024-05-02 ENCOUNTER — TELEPHONE (OUTPATIENT)
Dept: FAMILY MEDICINE CLINIC | Facility: CLINIC | Age: 69
End: 2024-05-02

## 2024-05-02 DIAGNOSIS — Z12.31 ENCOUNTER FOR SCREENING MAMMOGRAM FOR BREAST CANCER: Primary | ICD-10-CM

## 2024-05-02 NOTE — TELEPHONE ENCOUNTER
Please update Mammogram order. The patient's daughter in law was given [facility] phone number since they never called her to schedule it back in November 2023.

## 2024-05-14 ENCOUNTER — TELEPHONE (OUTPATIENT)
Age: 69
End: 2024-05-14

## 2024-09-12 ENCOUNTER — OFFICE VISIT (OUTPATIENT)
Dept: FAMILY MEDICINE CLINIC | Facility: CLINIC | Age: 69
End: 2024-09-12

## 2024-09-12 VITALS
HEART RATE: 61 BPM | TEMPERATURE: 97.3 F | HEIGHT: 57 IN | BODY MASS INDEX: 23.39 KG/M2 | RESPIRATION RATE: 14 BRPM | SYSTOLIC BLOOD PRESSURE: 128 MMHG | DIASTOLIC BLOOD PRESSURE: 80 MMHG | WEIGHT: 108.4 LBS | OXYGEN SATURATION: 97 %

## 2024-09-12 DIAGNOSIS — N32.81 OVERACTIVE BLADDER: ICD-10-CM

## 2024-09-12 DIAGNOSIS — K21.9 GASTRO-ESOPHAGEAL REFLUX DISEASE WITHOUT ESOPHAGITIS: ICD-10-CM

## 2024-09-12 DIAGNOSIS — F01.B0 MODERATE VASCULAR DEMENTIA WITHOUT BEHAVIORAL DISTURBANCE, PSYCHOTIC DISTURBANCE, MOOD DISTURBANCE, OR ANXIETY (HCC): ICD-10-CM

## 2024-09-12 DIAGNOSIS — I10 ESSENTIAL (PRIMARY) HYPERTENSION: ICD-10-CM

## 2024-09-12 PROCEDURE — 99214 OFFICE O/P EST MOD 30 MIN: CPT | Performed by: STUDENT IN AN ORGANIZED HEALTH CARE EDUCATION/TRAINING PROGRAM

## 2024-09-12 PROCEDURE — 3079F DIAST BP 80-89 MM HG: CPT | Performed by: STUDENT IN AN ORGANIZED HEALTH CARE EDUCATION/TRAINING PROGRAM

## 2024-09-12 PROCEDURE — 3074F SYST BP LT 130 MM HG: CPT | Performed by: STUDENT IN AN ORGANIZED HEALTH CARE EDUCATION/TRAINING PROGRAM

## 2024-09-12 PROCEDURE — 1123F ACP DISCUSS/DSCN MKR DOCD: CPT | Performed by: STUDENT IN AN ORGANIZED HEALTH CARE EDUCATION/TRAINING PROGRAM

## 2024-09-12 RX ORDER — MIRABEGRON 25 MG/1
25 TABLET, FILM COATED, EXTENDED RELEASE ORAL DAILY
Qty: 90 TABLET | Refills: 3 | Status: SHIPPED | OUTPATIENT
Start: 2024-09-12

## 2024-09-12 RX ORDER — LOSARTAN POTASSIUM 50 MG/1
50 TABLET ORAL DAILY
Qty: 90 TABLET | Refills: 3 | Status: SHIPPED | OUTPATIENT
Start: 2024-09-12

## 2024-09-12 RX ORDER — MEMANTINE HYDROCHLORIDE 10 MG/1
10 TABLET ORAL DAILY
Qty: 90 TABLET | Refills: 3 | Status: SHIPPED | OUTPATIENT
Start: 2024-09-12

## 2024-09-12 SDOH — ECONOMIC STABILITY: INCOME INSECURITY: HOW HARD IS IT FOR YOU TO PAY FOR THE VERY BASICS LIKE FOOD, HOUSING, MEDICAL CARE, AND HEATING?: NOT VERY HARD

## 2024-09-12 SDOH — ECONOMIC STABILITY: FOOD INSECURITY: WITHIN THE PAST 12 MONTHS, THE FOOD YOU BOUGHT JUST DIDN'T LAST AND YOU DIDN'T HAVE MONEY TO GET MORE.: NEVER TRUE

## 2024-09-12 SDOH — ECONOMIC STABILITY: FOOD INSECURITY: WITHIN THE PAST 12 MONTHS, YOU WORRIED THAT YOUR FOOD WOULD RUN OUT BEFORE YOU GOT MONEY TO BUY MORE.: NEVER TRUE

## 2024-09-12 ASSESSMENT — ENCOUNTER SYMPTOMS
COUGH: 0
SHORTNESS OF BREATH: 0
BACK PAIN: 0
CHEST TIGHTNESS: 0
DIARRHEA: 0
CONSTIPATION: 0
VOMITING: 0
EYE PAIN: 0
ABDOMINAL PAIN: 0
SORE THROAT: 0
RHINORRHEA: 0
NAUSEA: 0